# Patient Record
Sex: FEMALE | Race: WHITE | Employment: FULL TIME | URBAN - METROPOLITAN AREA
[De-identification: names, ages, dates, MRNs, and addresses within clinical notes are randomized per-mention and may not be internally consistent; named-entity substitution may affect disease eponyms.]

---

## 2017-03-29 ENCOUNTER — OFFICE VISIT (OUTPATIENT)
Dept: INTERNAL MEDICINE | Facility: CLINIC | Age: 37
End: 2017-03-29
Payer: COMMERCIAL

## 2017-03-29 VITALS
TEMPERATURE: 98.3 F | DIASTOLIC BLOOD PRESSURE: 60 MMHG | OXYGEN SATURATION: 99 % | SYSTOLIC BLOOD PRESSURE: 124 MMHG | BODY MASS INDEX: 22.68 KG/M2 | HEIGHT: 63 IN | WEIGHT: 128 LBS | RESPIRATION RATE: 12 BRPM | HEART RATE: 72 BPM

## 2017-03-29 DIAGNOSIS — J03.90 TONSILLITIS: ICD-10-CM

## 2017-03-29 DIAGNOSIS — R07.0 THROAT PAIN: Primary | ICD-10-CM

## 2017-03-29 LAB
DEPRECATED S PYO AG THROAT QL EIA: NORMAL
MICRO REPORT STATUS: NORMAL
SPECIMEN SOURCE: NORMAL

## 2017-03-29 PROCEDURE — 87880 STREP A ASSAY W/OPTIC: CPT | Performed by: NURSE PRACTITIONER

## 2017-03-29 PROCEDURE — 99213 OFFICE O/P EST LOW 20 MIN: CPT | Performed by: NURSE PRACTITIONER

## 2017-03-29 PROCEDURE — 87081 CULTURE SCREEN ONLY: CPT | Performed by: NURSE PRACTITIONER

## 2017-03-29 RX ORDER — CEFDINIR 300 MG/1
300 CAPSULE ORAL 2 TIMES DAILY
Qty: 20 CAPSULE | Refills: 0 | Status: SHIPPED | OUTPATIENT
Start: 2017-03-29 | End: 2017-08-02

## 2017-03-29 RX ORDER — MULTIPLE VITAMINS W/ MINERALS TAB 9MG-400MCG
1 TAB ORAL DAILY
Qty: 100 TABLET | Refills: 3 | COMMUNITY
Start: 2017-03-29 | End: 2018-03-20

## 2017-03-29 NOTE — MR AVS SNAPSHOT
After Visit Summary   3/29/2017    Dara Zavala    MRN: [id number]           Patient Information     Date Of Birth          1980        Visit Information        Provider Department      3/29/2017 2:40 PM Nataliya Boothe APRN CNP [facility]        Today's Diagnoses     Throat pain    -  1    Tonsillitis          Care Instructions    N: [facility] [phone]   [email or web address]  Call for an appointment     Omnicef twice daily for 10 days     Follow up with any questions or concerns.           Follow-ups after your visit        Additional Services     OTOLARYNGOLOGY REFERRAL       Your provider has referred you to: [facility]: [facility] [phone]   [email or web address]    Please be aware that coverage of these services is subject to the terms and limitations of your health insurance plan.  Call member services at your health plan with any benefit or coverage questions.      Please bring the following with you to your appointment:    (1) Any X-Rays, CTs or MRIs which have been performed.  Contact the facility where they were done to arrange for  prior to your scheduled appointment.   (2) List of current medications  (3) This referral request   (4) Any documents/labs given to you for this referral                  Who to contact     If you have questions or need follow up information about today's clinic visit or your schedule please contact [facility] directly at [phone].  Normal or non-critical lab and imaging results will be communicated to you by MyChart, letter or phone within 4 business days after the clinic has received the results. If you do not hear from us within 7 days, please contact the clinic through MyChart or phone. If you have a critical or abnormal lab result, we will notify you by phone as soon as possible.  Submit refill requests  "through [employer] or call your pharmacy and they will forward the refill request to us. Please allow 3 business days for your refill to be completed.          Additional Information About Your Visit        [employer] Information     [employer] lets you send messages to your doctor, view your test results, renew your prescriptions, schedule appointments and more. To sign up, go to [email or web address] . Click on \"Log in\" on the left side of the screen, which will take you to the Welcome page. Then click on \"Sign up Now\" on the right side of the page.     You will be asked to enter the access code listed below, as well as some personal information. Please follow the directions to create your username and password.     Your access code is: [email or web address]  Expires: 2017  3:14 PM     Your access code will  in 90 days. If you need help or a new code, please call your [facility] clinic or [phone].        Care EveryWhere ID     This is your Care EveryWhere ID. This could be used by other organizations to access your [facility] medical records  [id number]        Your Vitals Were     Pulse Temperature Respirations Height Last Period Pulse Oximetry    72 98.3  F (36.8  C) (Oral) 12 5' 3\" (1.6 m) 2017 99%    BMI (Body Mass Index)                   22.67 kg/m2            Blood Pressure from Last 3 Encounters:   17 124/60   16 104/68   16 120/58    Weight from Last 3 Encounters:   17 128 lb (58.1 kg)   16 132 lb (59.9 kg)   16 139 lb (63 kg)              We Performed the Following     Beta strep group A culture     OTOLARYNGOLOGY REFERRAL     Strep, Rapid Screen          Today's Medication Changes          These changes are accurate as of: 3/29/17  3:14 PM.  If you have any questions, ask your nurse or doctor.               Start taking these medicines.        Dose/Directions    cefdinir 300 MG capsule   Commonly known as:  OMNICEF   Used for:  Tonsillitis, Throat pain   Started " by:  Nataliya Boothe APRN CNP        Dose:  300 mg   Take 1 capsule (300 mg) by mouth 2 times daily   Quantity:  20 capsule   Refills:  0            Where to get your medicines      These medications were sent to [facility]/pharmacy #[id number] - [place], [place] - [address]  [address], [place] [address]     Phone:  [phone]     cefdinir 300 MG capsule                Primary Care Provider Office Phone # Fax #    Patricia Faustin [phone]       [facility] [address]  [facility] [address]        Thank you!     Thank you for choosing [facility]  for your care. Our goal is always to provide you with excellent care. Hearing back from our patients is one way we can continue to improve our services. Please take a few minutes to complete the written survey that you may receive in the mail after your visit with us. Thank you!             Your Updated Medication List - Protect others around you: Learn how to safely use, store and throw away your medicines at www.disposemymeds.org.          This list is accurate as of: 3/29/17  3:14 PM.  Always use your most recent med list.                   Brand Name Dispense Instructions for use    cefdinir 300 MG capsule    OMNICEF    20 capsule    Take 1 capsule (300 mg) by mouth 2 times daily       Multi-vitamin Tabs tablet     100 tablet    Take 1 tablet by mouth daily       norgestim-eth estrad triphasic 0.18/0.215/0.25 MG-25 MCG per tablet    ORTHO TRI-CYCLEN LO    84 tablet    Take 1 tablet by mouth daily

## 2017-03-29 NOTE — NURSING NOTE
"Chief Complaint   Patient presents with     Pharyngitis     Sore throat started last night. Headache, body aches today. Hx of strep.       Initial /60 (BP Location: Left arm, Patient Position: Chair, Cuff Size: Adult Regular)  Pulse 72  Temp 98.3  F (36.8  C) (Oral)  Resp 12  Ht 5' 3\" (1.6 m)  Wt 128 lb (58.1 kg)  LMP 03/27/2017  SpO2 99%  BMI 22.67 kg/m2 Estimated body mass index is 22.67 kg/(m^2) as calculated from the following:    Height as of this encounter: 5' 3\" (1.6 m).    Weight as of this encounter: 128 lb (58.1 kg).  Medication Reconciliation: complete     SAMANTHA Myers      "

## 2017-03-29 NOTE — LETTER
[place] [address]  [facility] [address]  [phone]          March 29, 2017    RE:  Dara Zavala                                                                                                                                                       [address]  [facility] [place] [facility] [place] [address]            To whom it may concern:    Dara Zavala was seen in clinic and is being treated for illness.          Sincerely,        Nataliya Boothe RN, CNP

## 2017-03-29 NOTE — PATIENT INSTRUCTIONS
[facility]: [facility] [phone]   [email or web address]  Call for an appointment     Omnicef twice daily for 10 days     Follow up with any questions or concerns.

## 2017-03-29 NOTE — PROGRESS NOTES
"  SUBJECTIVE:                                                    Dara Zavala is a 36 year old female who presents to clinic today for the following health issues:    Chief Complaint   Patient presents with     Pharyngitis     Sore throat started last night. Headache, body aches today. Hx of strep.  No fever and usually does   Tender lymph nodes and throat irritated   Happening often   Strep as adult 7 times     Penicillin rash was only a small rash on her lower arm - they just said to stay away from it in case             Problem list and histories reviewed & adjusted, as indicated.  Additional history: as documented    There is no problem list on file for this patient.    No past surgical history on file.    Social History   Substance Use Topics     Smoking status: Current Every Day Smoker     Smokeless tobacco: Not on file     Alcohol use Yes      Comment: occasional     Family History   Problem Relation Age of Onset     Alcohol/Drug Mother      alcohol     Other - See Comments Father      accident-electricuted     Other - See Comments Maternal Grandmother      MVA     Alcohol/Drug Maternal Grandfather      drinker           Reviewed and updated as needed this visit by clinical staff  Tobacco  Allergies  Soc Hx      Reviewed and updated as needed this visit by Provider         ROS:  Constitutional, HEENT, cardiovascular, pulmonary, GI, , musculoskeletal, neuro, skin, endocrine and psych systems are negative, except as otherwise noted.    OBJECTIVE:                                                    /60 (BP Location: Left arm, Patient Position: Chair, Cuff Size: Adult Regular)  Pulse 72  Temp 98.3  F (36.8  C) (Oral)  Resp 12  Ht 5' 3\" (1.6 m)  Wt 128 lb (58.1 kg)  LMP 03/27/2017  SpO2 99%  BMI 22.67 kg/m2  Body mass index is 22.67 kg/(m^2).  GENERAL: alert and mod distress  HENT: nose and mouth with erythema in tonsils with exudate bilaterally ; right worse than left i  RESP: lungs clear to " auscultation - no rales, rhonchi or wheezes  CV: regular rate and rhythm  PSYCH: mentation appears normal, affect normal/bright    Diagnostic Test Results:  Strep negative      ASSESSMENT/PLAN:                                                              ICD-10-CM    1. Throat pain R07.0 Strep, Rapid Screen     OTOLARYNGOLOGY REFERRAL     Beta strep group A culture     cefdinir (OMNICEF) 300 MG capsule   2. Tonsillitis J03.90 cefdinir (OMNICEF) 300 MG capsule       Patient Instructions   [facility]: [facility] [phone]   [email or web address]  Call for an appointment     Omnicef twice daily for 10 days     Follow up with any questions or concerns.         CATHY Zimmerman [facility]

## 2017-03-31 LAB
BACTERIA SPEC CULT: NORMAL
MICRO REPORT STATUS: NORMAL
SPECIMEN SOURCE: NORMAL

## 2017-05-16 DIAGNOSIS — Z30.41 ENCOUNTER FOR SURVEILLANCE OF CONTRACEPTIVE PILLS: ICD-10-CM

## 2017-05-16 RX ORDER — NORGESTIMATE AND ETHINYL ESTRADIOL 7DAYSX3 LO
KIT ORAL
Qty: 84 TABLET | Refills: 0 | Status: SHIPPED | OUTPATIENT
Start: 2017-05-16 | End: 2017-07-26

## 2017-05-16 NOTE — TELEPHONE ENCOUNTER
Ortho Tri-cyclen      Last Written Prescription Date: 6/28/16  Last Fill Quantity: 84,  # refills: 3   [place] Office Visit with [facility] [place] or [facility] prescribing provider: 3/29/17                                         Next 5 appointments (look out 90 days)     Jul 14, 2017  9:20 AM CDT   [facility]   [facility]    [address] [facility] [address] [phone]               Prescription approved per [facility] Refill Protocol.

## 2017-07-25 ENCOUNTER — NURSE TRIAGE (OUTPATIENT)
Dept: NURSING | Facility: CLINIC | Age: 37
End: 2017-07-25

## 2017-07-25 DIAGNOSIS — Z30.41 ENCOUNTER FOR SURVEILLANCE OF CONTRACEPTIVE PILLS: ICD-10-CM

## 2017-07-25 RX ORDER — NORGESTIMATE AND ETHINYL ESTRADIOL 7DAYSX3 LO
1 KIT ORAL DAILY
Qty: 84 TABLET | Refills: 0 | OUTPATIENT
Start: 2017-07-25

## 2017-07-25 NOTE — TELEPHONE ENCOUNTER
Clinic Action Needed?      [facility]NA Triage Call   Presenting Problem: Pt calling FNA requesting refill on norgestim-eth estrad triphasic (ORTHO TRI-CYCLEN LO). Pharmacy sent refill request to clinic because they have no refills left on Rx and pt states she is out. Clinic denied refill because too soon and due for yearly visit. Pt states she was not notified of denial. Last Px June 2016 so due for annual visit. Rec'd 84 tablets on 5/16/17; should be enough until next month. Pt says she skips placebo pill week. Instead starts the next week of active pills. Even so, this request is early. Given this, cannot refill Rx.Will defer to clinic and they denied refill    Patient Teaching/Discussion: Disc'd above w/pt and explained we cannot refill Rx tonight. Advised pt call tomorrow during clinic hours to discuss w/ provider. Pt states she will do this. Linda Lamas RN/FNA      Routed to:  MAVERICK rice

## 2017-07-25 NOTE — TELEPHONE ENCOUNTER
"Requesting refill on BCPs.  Please see telephone note dated today (7/25/17) and sent to clinic. Linda Lamas RN/CARMEN    Reason for Disposition    Caller requesting a NON-URGENT new prescription or refill and triager unable to refill per unit policy    Additional Information    Negative: Drug overdose and nurse unable to answer question    Negative: Caller requesting information not related to medicine    Negative: Caller requesting a prescription for Strep throat and has a positive culture result    Negative: Rash while taking a medication or within 3 days of stopping it    Negative: Immunization reaction suspected    Negative: [1] Asthma and [2] having symptoms of asthma (cough, wheezing, etc)    Negative: MORE THAN A DOUBLE DOSE of a prescription or over-the-counter (OTC) drug    Negative: [1] DOUBLE DOSE (an extra dose or lesser amount) of over-the-counter (OTC) drug AND [2] any symptoms (e.g., dizziness, nausea, pain, sleepiness)    Negative: [1] DOUBLE DOSE (an extra dose or lesser amount) of prescription drug AND [2] any symptoms (e.g., dizziness, nausea, pain, sleepiness)    Negative: Took another person's prescription drug    Negative: [1] DOUBLE DOSE (an extra dose or lesser amount) of prescription drug AND [2] NO symptoms (Exception: a double dose of antibiotics)    Negative: Diabetes drug error or overdose (e.g., insulin or extra dose)    Negative: [1] Request for URGENT new prescription or refill of \"essential\" medication (i.e., likelihood of harm to patient if not taken) AND [2] triager unable to fill per unit policy    Negative: [1] Prescription not at pharmacy AND [2] was prescribed today by PCP    Negative: Pharmacy calling with prescription questions and triager unable to answer question    Negative: Caller has URGENT medication question about med that PCP prescribed and triager unable to answer question    Negative: Caller has NON-URGENT medication question about med that PCP prescribed and triager " unable to answer question    Protocols used: MEDICATION QUESTION CALL-ADULT-

## 2017-07-25 NOTE — TELEPHONE ENCOUNTER
Ortho Tri-Cyclen Lo      Last Written Prescription Date: 5/16/17  Last Fill Quantity: 84,  # refills: 0   Last Office Visit with G, P or [facility] prescribing provider: 3/29/17

## 2017-07-26 RX ORDER — NORGESTIMATE AND ETHINYL ESTRADIOL 7DAYSX3 LO
1 KIT ORAL DAILY
Qty: 84 TABLET | Refills: 0 | Status: SHIPPED | OUTPATIENT
Start: 2017-07-26 | End: 2017-10-16

## 2017-07-26 NOTE — TELEPHONE ENCOUNTER
Pt scheduled Physical appt for 8/2/17 with Nataliya Boothe/Fasting/Pap.  Needs enough Birth control until appt.

## 2017-08-02 ENCOUNTER — TELEPHONE (OUTPATIENT)
Dept: INTERNAL MEDICINE | Facility: CLINIC | Age: 37
End: 2017-08-02

## 2017-08-02 ENCOUNTER — OFFICE VISIT (OUTPATIENT)
Dept: INTERNAL MEDICINE | Facility: CLINIC | Age: 37
End: 2017-08-02
Payer: COMMERCIAL

## 2017-08-02 VITALS
DIASTOLIC BLOOD PRESSURE: 68 MMHG | RESPIRATION RATE: 16 BRPM | HEART RATE: 63 BPM | TEMPERATURE: 98.3 F | WEIGHT: 136.5 LBS | BODY MASS INDEX: 24.19 KG/M2 | OXYGEN SATURATION: 99 % | SYSTOLIC BLOOD PRESSURE: 100 MMHG | HEIGHT: 63 IN

## 2017-08-02 DIAGNOSIS — N76.0 VAGINITIS AND VULVOVAGINITIS: Primary | ICD-10-CM

## 2017-08-02 DIAGNOSIS — N76.0 VAGINITIS AND VULVOVAGINITIS: ICD-10-CM

## 2017-08-02 DIAGNOSIS — Z00.00 HEALTH CARE MAINTENANCE: Primary | ICD-10-CM

## 2017-08-02 LAB
ANION GAP SERPL CALCULATED.3IONS-SCNC: 8 MMOL/L (ref 3–14)
BUN SERPL-MCNC: 11 MG/DL (ref 7–30)
CALCIUM SERPL-MCNC: 8.7 MG/DL (ref 8.5–10.1)
CHLORIDE SERPL-SCNC: 105 MMOL/L (ref 94–109)
CHOLEST SERPL-MCNC: 163 MG/DL
CO2 SERPL-SCNC: 27 MMOL/L (ref 20–32)
CREAT SERPL-MCNC: 0.89 MG/DL (ref 0.52–1.04)
ERYTHROCYTE [DISTWIDTH] IN BLOOD BY AUTOMATED COUNT: 11.7 % (ref 10–15)
GFR SERPL CREATININE-BSD FRML MDRD: 72 ML/MIN/1.7M2
GLUCOSE SERPL-MCNC: 87 MG/DL (ref 70–99)
HCT VFR BLD AUTO: 40.3 % (ref 35–47)
HDLC SERPL-MCNC: 84 MG/DL
HGB BLD-MCNC: 13.4 G/DL (ref 11.7–15.7)
LDLC SERPL CALC-MCNC: 62 MG/DL
MCH RBC QN AUTO: 32.1 PG (ref 26.5–33)
MCHC RBC AUTO-ENTMCNC: 33.3 G/DL (ref 31.5–36.5)
MCV RBC AUTO: 97 FL (ref 78–100)
MICRO REPORT STATUS: ABNORMAL
NONHDLC SERPL-MCNC: 79 MG/DL
PLATELET # BLD AUTO: 292 10E9/L (ref 150–450)
POTASSIUM SERPL-SCNC: 4.1 MMOL/L (ref 3.4–5.3)
RBC # BLD AUTO: 4.17 10E12/L (ref 3.8–5.2)
SODIUM SERPL-SCNC: 140 MMOL/L (ref 133–144)
SPECIMEN SOURCE: ABNORMAL
TRIGL SERPL-MCNC: 85 MG/DL
WBC # BLD AUTO: 9.3 10E9/L (ref 4–11)
WET PREP SPEC: ABNORMAL

## 2017-08-02 PROCEDURE — 80061 LIPID PANEL: CPT | Performed by: NURSE PRACTITIONER

## 2017-08-02 PROCEDURE — 99395 PREV VISIT EST AGE 18-39: CPT | Performed by: NURSE PRACTITIONER

## 2017-08-02 PROCEDURE — 80048 BASIC METABOLIC PNL TOTAL CA: CPT | Performed by: NURSE PRACTITIONER

## 2017-08-02 PROCEDURE — 87210 SMEAR WET MOUNT SALINE/INK: CPT | Performed by: NURSE PRACTITIONER

## 2017-08-02 PROCEDURE — 36415 COLL VENOUS BLD VENIPUNCTURE: CPT | Performed by: NURSE PRACTITIONER

## 2017-08-02 PROCEDURE — 85027 COMPLETE CBC AUTOMATED: CPT | Performed by: NURSE PRACTITIONER

## 2017-08-02 RX ORDER — FLUCONAZOLE 150 MG/1
150 TABLET ORAL ONCE
Qty: 1 TABLET | Refills: 0 | Status: SHIPPED | OUTPATIENT
Start: 2017-08-02 | End: 2017-08-02

## 2017-08-02 NOTE — LETTER
[facility]  [address], [address]  [place] [address]  [phone]        August 2, 2017    Dara Zavala  [address]   [place] [address]            Dear Ms. Dara Zavala:      The results of your recent Basic profile, CBC Panel and Lipid profile were NORMAL.  If you have any further questions or problems, please contact our office.    Sincerely,        Elizabeth Iverson C.N.P.

## 2017-08-02 NOTE — NURSING NOTE
"Chief Complaint   Patient presents with     Physical     fasting, no pap needed.       Initial /68 (BP Location: Right arm, Patient Position: Sitting, Cuff Size: Adult Large)  Pulse 63  Temp 98.3  F (36.8  C) (Oral)  Resp 16  Ht 5' 3\" (1.6 m)  Wt 136 lb 8 oz (61.9 kg)  LMP 07/21/2017 (Exact Date)  SpO2 99%  BMI 24.18 kg/m2 Estimated body mass index is 24.18 kg/(m^2) as calculated from the following:    Height as of this encounter: 5' 3\" (1.6 m).    Weight as of this encounter: 136 lb 8 oz (61.9 kg).  Medication Reconciliation: complete    "

## 2017-08-02 NOTE — PROGRESS NOTES
SUBJECTIVE:   CC: Dara Zavala is an 36 year old woman who presents for preventive health visit.     Healthy Habits:    Do you get at least three servings of calcium containing foods daily (dairy, green leafy vegetables, etc.)? yes    Amount of exercise or daily activities, outside of work: 4 day(s) per week    Problems taking medications regularly No    Medication side effects: No    Have you had an eye exam in the past two years? no    Do you see a dentist twice per year? yes    Do you have sleep apnea, excessive snoring or daytime drowsiness?no        Vaginal Symptoms  Duration of complaint: 2 years, since retained tampon for several months   Description:  Vaginal Discharge: white creamy   Itching (Pruritis): no  Burning sensation:  no  Odor: YES  Accompanying Signs & Symptoms:  Pain with Urination: no  Abdominal Pain: no  Fever: no  History:   Sexually active: YES  New Partner: no   Possibility of Pregnancy:  No  Precipitating factors:   Recent Antibiotic Use: no  Alleviating factors: none  Therapies Tried and outcome: none        Today's PHQ-2 Score: PHQ-2 ( 1999 Pfizer) 3/29/2017 5/13/2015   Q1: Little interest or pleasure in doing things 0 0   Q2: Feeling down, depressed or hopeless 0 0   PHQ-2 Score 0 0       Abuse: Current or Past(Physical, Sexual or Emotional)- No  Do you feel safe in your environment - Yes    Social History   Substance Use Topics     Smoking status: Current Every Day Smoker     Smokeless tobacco: Never Used     Alcohol use Yes      Comment: occasional     The patient does not drink >3 drinks per day nor >7 drinks per week.    Reviewed orders with patient.  Reviewed health maintenance and updated orders accordingly - Yes  BP Readings from Last 3 Encounters:   08/02/17 100/68   03/29/17 124/60   06/28/16 104/68    Wt Readings from Last 3 Encounters:   08/02/17 136 lb 8 oz (61.9 kg)   03/29/17 128 lb (58.1 kg)   06/28/16 132 lb (59.9 kg)                  There is no problem list on  "file for this patient.    History reviewed. No pertinent surgical history.    Social History   Substance Use Topics     Smoking status: Current Every Day Smoker     Types: Cigarettes     Smokeless tobacco: Never Used     Alcohol use Yes      Comment: 4-6 beers 2-3 x weekly     Family History   Problem Relation Age of Onset     Alcohol/Drug Mother      alcohol     Other - See Comments Father      accident-electricuted     Other - See Comments Maternal Grandmother      MVA     Alcohol/Drug Maternal Grandfather      drinker         Current Outpatient Prescriptions   Medication Sig Dispense Refill     norgestim-eth estrad triphasic (ORTHO TRI-CYCLEN LO) 0.18/0.215/0.25 MG-25 MCG per tablet Take 1 tablet by mouth daily 84 tablet 0     multivitamin, therapeutic with minerals (MULTI-VITAMIN) TABS tablet Take 1 tablet by mouth daily 100 tablet 3           Mammogram not appropriate for this patient based on age.    Pertinent mammograms are reviewed under the imaging tab.  History of abnormal Pap smear: NO - age 30- 65 PAP every 3 years recommended    Reviewed and updated as needed this visit by clinical staffTobacco  Allergies  Meds  Med Hx  Surg Hx  Fam Hx  Soc Hx        Reviewed and updated as needed this visit by Provider            ROS:  C: NEGATIVE for fever, chills, change in weight  ENT: NEGATIVE for ear, mouth and throat problems  R: NEGATIVE for significant cough or SOB  CV: NEGATIVE for chest pain, palpitations or peripheral edema  GI: NEGATIVE for nausea, abdominal pain, heartburn, or change in bowel habits  M: NEGATIVE for significant arthralgias or myalgia  N: NEGATIVE for weakness, dizziness or paresthesias  P: NEGATIVE for changes in mood or affect    OBJECTIVE:   /68 (BP Location: Right arm, Patient Position: Sitting, Cuff Size: Adult Large)  Pulse 63  Temp 98.3  F (36.8  C) (Oral)  Resp 16  Ht 5' 3\" (1.6 m)  Wt 136 lb 8 oz (61.9 kg)  LMP 07/21/2017 (Exact Date)  SpO2 99%  BMI 24.18 " "kg/m2  EXAM:  GENERAL: healthy, alert and no distress  NECK: no adenopathy, no asymmetry, masses, or scars and thyroid normal to palpation  RESP: lungs clear to auscultation - no rales, rhonchi or wheezes  CV: regular rate and rhythm, normal S1 S2, no S3 or S4, no murmur, click or rub, no peripheral edema and peripheral pulses strong  ABDOMEN: soft, nontender, no hepatosplenomegaly, no masses and bowel sounds normal  MS: no gross musculoskeletal defects noted, no edema    ASSESSMENT/PLAN:       ICD-10-CM    1. Health care maintenance Z00.00 CBC with platelets     Basic metabolic panel     Lipid Profile   2. Vaginitis and vulvovaginitis N76.0 Wet prep       COUNSELING:   Reviewed preventive health counseling, as reflected in patient instructions         reports that she has been smoking.  She has never used smokeless tobacco.    Estimated body mass index is 24.18 kg/(m^2) as calculated from the following:    Height as of this encounter: 5' 3\" (1.6 m).    Weight as of this encounter: 136 lb 8 oz (61.9 kg).         Counseling Resources:  ATP IV Guidelines  Pooled Cohorts Equation Calculator  Breast Cancer Risk Calculator  FRAX Risk Assessment  ICSI Preventive Guidelines  Dietary Guidelines for Americans, 2010  USDA's MyPlate  ASA Prophylaxis  Lung CA Screening    Elizabeth Iverson NP  [facility]  "

## 2017-08-02 NOTE — MR AVS SNAPSHOT
After Visit Summary   8/2/2017    Dara Zavala    MRN: [id number]           Patient Information     Date Of Birth          1980        Visit Information        Provider Department      8/2/2017 8:00 AM Elizabeth Iverson NP [facility]        Today's Diagnoses     Health care maintenance    -  1    Vaginitis and vulvovaginitis          Care Instructions      Preventive Health Recommendations  Female Ages 26 - 39  Yearly exam:   See your health care provider every year in order to    Review health changes.     Discuss preventive care.      Review your medicines if you your doctor has prescribed any.    Until age 30: Get a Pap test every three years (more often if you have had an abnormal result).    After age 30: Talk to your doctor about whether you should have a Pap test every 3 years or have a Pap test with HPV screening every 5 years.   You do not need a Pap test if your uterus was removed (hysterectomy) and you have not had cancer.  You should be tested each year for STDs (sexually transmitted diseases), if you're at risk.   Talk to your provider about how often to have your cholesterol checked.  If you are at risk for diabetes, you should have a diabetes test (fasting glucose).  Shots: Get a flu shot each year. Get a tetanus shot every 10 years.   Nutrition:     Eat at least 5 servings of fruits and vegetables each day.    Eat whole-grain bread, whole-wheat pasta and brown rice instead of white grains and rice.    Talk to your provider about Calcium and Vitamin D.     Lifestyle    Exercise at least 150 minutes a week (30 minutes a day, 5 days of the week). This will help you control your weight and prevent disease.    Limit alcohol to one drink per day.    No smoking.     Wear sunscreen to prevent skin cancer.    See your dentist every six months for an exam and cleaning.            Follow-ups after your visit        Who to contact     If you have questions or need  "follow up information about today's clinic visit or your schedule please contact [facility] directly at [phone].  Normal or non-critical lab and imaging results will be communicated to you by MyChart, letter or phone within 4 business days after the clinic has received the results. If you do not hear from us within 7 days, please contact the clinic through Matchpointhart or phone. If you have a critical or abnormal lab result, we will notify you by phone as soon as possible.  Submit refill requests through Lexdir or call your pharmacy and they will forward the refill request to us. Please allow 3 business days for your refill to be completed.          Additional Information About Your Visit        MatchpointharCL3VER Information     Lexdir gives you secure access to your electronic health record. If you see a primary care provider, you can also send messages to your care team and make appointments. If you have questions, please call your primary care clinic.  If you do not have a primary care provider, please call [phone] and they will assist you.        Care EveryWhere ID     This is your Care EveryWhere ID. This could be used by other organizations to access your [place] medical records  [id number]        Your Vitals Were     Pulse Temperature Respirations Height Last Period Pulse Oximetry    63 98.3  F (36.8  C) (Oral) 16 5' 3\" (1.6 m) 07/21/2017 (Exact Date) 99%    BMI (Body Mass Index)                   24.18 kg/m2            Blood Pressure from Last 3 Encounters:   08/02/17 100/68   03/29/17 124/60   06/28/16 104/68    Weight from Last 3 Encounters:   08/02/17 136 lb 8 oz (61.9 kg)   03/29/17 128 lb (58.1 kg)   06/28/16 132 lb (59.9 kg)              We Performed the Following     Basic metabolic panel     CBC with platelets     Lipid Profile     Wet prep        Primary Care Provider Office Phone # Fax #    Patricia Faustin [phone]       [facility] [address] " [facility] [address]  [facility] [address]        Equal Access to Services     INDIGO MUNIZ : Hadii dominga coats bethanieivana Dcali, waaxda luqadaha, qaybta kaayshamargarita adams, alma delia porrasperrydionisio saul. So [facility] [phone].    ATENCIÓN: Si habla español, tiene a newell disposición servicios gratuitos de asistencia lingüística. Llame al [phone].    We comply with applicable federal civil rights laws and [place] laws. We do not discriminate on the basis of race, color, national origin, age, disability sex, sexual orientation or gender identity.            Thank you!     Thank you for choosing [facility]  for your care. Our goal is always to provide you with excellent care. Hearing back from our patients is one way we can continue to improve our services. Please take a few minutes to complete the written survey that you may receive in the mail after your visit with us. Thank you!             Your Updated Medication List - Protect others around you: Learn how to safely use, store and throw away your medicines at www.disposemymeds.org.          This list is accurate as of: 8/2/17  8:46 AM.  Always use your most recent med list.                   Brand Name Dispense Instructions for use Diagnosis    Multi-vitamin Tabs tablet     100 tablet    Take 1 tablet by mouth daily        norgestim-eth estrad triphasic 0.18/0.215/0.25 MG-25 MCG per tablet    ORTHO TRI-CYCLEN LO    84 tablet    Take 1 tablet by mouth daily    Encounter for surveillance of contraceptive pills

## 2017-10-16 DIAGNOSIS — Z30.41 ENCOUNTER FOR SURVEILLANCE OF CONTRACEPTIVE PILLS: ICD-10-CM

## 2017-10-16 RX ORDER — NORGESTIMATE AND ETHINYL ESTRADIOL
KIT
Qty: 84 TABLET | Refills: 0 | Status: CANCELLED | OUTPATIENT
Start: 2017-10-16

## 2017-10-16 RX ORDER — NORGESTIMATE AND ETHINYL ESTRADIOL 7DAYSX3 LO
1 KIT ORAL DAILY
Qty: 84 TABLET | Refills: 2 | Status: SHIPPED | OUTPATIENT
Start: 2017-10-16 | End: 2018-03-20

## 2017-10-16 NOTE — TELEPHONE ENCOUNTER
Patient states she is out of BCP, needs refill for tonight's pill.  Ortho tri-cyclen Lo Prescription approved per [facility] Refill Protocol.

## 2017-10-17 DIAGNOSIS — Z30.41 ENCOUNTER FOR SURVEILLANCE OF CONTRACEPTIVE PILLS: ICD-10-CM

## 2017-10-17 NOTE — TELEPHONE ENCOUNTER
Sprintec    DUPLICATE?  Last Written Prescription Date: 10/16/17  Last Fill Quantity: 84,  # refills: 2   Last Office Visit with G, P or [facility] prescribing provider: 08/02/17 Iverson

## 2017-10-19 RX ORDER — NORGESTIMATE AND ETHINYL ESTRADIOL
KIT
Qty: 84 TABLET | Refills: 0 | OUTPATIENT
Start: 2017-10-19

## 2017-11-09 ENCOUNTER — OFFICE VISIT (OUTPATIENT)
Dept: FAMILY MEDICINE | Facility: CLINIC | Age: 37
End: 2017-11-09
Payer: COMMERCIAL

## 2017-11-09 VITALS
HEART RATE: 88 BPM | BODY MASS INDEX: 24 KG/M2 | SYSTOLIC BLOOD PRESSURE: 110 MMHG | TEMPERATURE: 98.4 F | DIASTOLIC BLOOD PRESSURE: 76 MMHG | WEIGHT: 135.5 LBS | OXYGEN SATURATION: 98 % | RESPIRATION RATE: 16 BRPM

## 2017-11-09 DIAGNOSIS — J20.9 ACUTE BRONCHITIS, UNSPECIFIED ORGANISM: Primary | ICD-10-CM

## 2017-11-09 PROCEDURE — 99213 OFFICE O/P EST LOW 20 MIN: CPT | Performed by: PHYSICIAN ASSISTANT

## 2017-11-09 RX ORDER — AZITHROMYCIN 250 MG/1
TABLET, FILM COATED ORAL
Qty: 6 TABLET | Refills: 0 | Status: SHIPPED | OUTPATIENT
Start: 2017-11-09 | End: 2018-03-20

## 2017-11-09 RX ORDER — ALBUTEROL SULFATE 90 UG/1
2 AEROSOL, METERED RESPIRATORY (INHALATION) EVERY 6 HOURS PRN
Qty: 1 INHALER | Refills: 0 | Status: SHIPPED | OUTPATIENT
Start: 2017-11-09 | End: 2018-03-20

## 2017-11-09 NOTE — PROGRESS NOTES
SUBJECTIVE:   Dara Zavala is a 37 year old female who presents to clinic today for the following health issues:      RESPIRATORY SYMPTOMS      Duration: 2 weeks    Description  nasal congestion, sore throat and cough    Severity: mild to moderate    Accompanying signs and symptoms: coughing up green mucus, wheezing    History (predisposing factors):  none    Therapies tried and outcome:  Cough drops            Problem list and histories reviewed & adjusted, as indicated.  Additional history: as documented    There is no problem list on file for this patient.    History reviewed. No pertinent surgical history.    Social History   Substance Use Topics     Smoking status: Current Every Day Smoker     Types: Cigarettes     Smokeless tobacco: Never Used     Alcohol use Yes      Comment: 4-6 beers 2-3 x weekly     Family History   Problem Relation Age of Onset     Alcohol/Drug Mother      alcohol     Other - See Comments Father      accident-electricuted     Other - See Comments Maternal Grandmother      MVA     Alcohol/Drug Maternal Grandfather      drinker         Current Outpatient Prescriptions   Medication Sig Dispense Refill     azithromycin (ZITHROMAX) 250 MG tablet Two tablets first day, then one tablet daily for four days. 6 tablet 0     albuterol (PROAIR HFA/PROVENTIL HFA/VENTOLIN HFA) 108 (90 BASE) MCG/ACT Inhaler Inhale 2 puffs into the lungs every 6 hours as needed for shortness of breath / dyspnea or wheezing 1 Inhaler 0     norgestim-eth estrad triphasic (ORTHO TRI-CYCLEN LO) 0.18/0.215/0.25 MG-25 MCG per tablet Take 1 tablet by mouth daily 84 tablet 2     multivitamin, therapeutic with minerals (MULTI-VITAMIN) TABS tablet Take 1 tablet by mouth daily 100 tablet 3     Allergies   Allergen Reactions     Penicillins      rash         Reviewed and updated as needed this visit by clinical staffTobacco  Allergies  Meds  Problems  Med Hx  Surg Hx  Fam Hx  Soc Hx        Reviewed and updated as needed  this visit by Provider  Allergies  Meds  Problems         ROS:  Constitutional, HEENT, cardiovascular, pulmonary, gi and gu systems are negative, except as otherwise noted.      OBJECTIVE:   /76 (BP Location: Left arm, Patient Position: Sitting, Cuff Size: Adult Regular)  Pulse 88  Temp 98.4  F (36.9  C) (Oral)  Resp 16  Wt 135 lb 8 oz (61.5 kg)  SpO2 98%  BMI 24 kg/m2  Body mass index is 24 kg/(m^2).  GENERAL: healthy, alert and no distress  EYES: Eyes grossly normal to inspection, PERRL and conjunctivae and sclerae normal  HENT: ear canals and TM's normal, nose and mouth without ulcers or lesions  NECK: no adenopathy, no asymmetry, masses, or scars and thyroid normal to palpation  RESP: lungs clear to auscultation - no rales, rhonchi; slight wheeze and chest congestion throughout  CV: regular rate and rhythm, normal S1 S2, no S3 or S4, no murmur, click or rub, no peripheral edema and peripheral pulses strong  MS: no gross musculoskeletal defects noted, no edema    Diagnostic Test Results:  none     ASSESSMENT/PLAN:       ICD-10-CM    1. Acute bronchitis, unspecified organism J20.9 azithromycin (ZITHROMAX) 250 MG tablet     albuterol (PROAIR HFA/PROVENTIL HFA/VENTOLIN HFA) 108 (90 BASE) MCG/ACT Inhaler       Patient Instructions   Encouraged mucinex/guafenisin, warm salt water gargles, cepacol spray, soothers/lozenges, sinus rinses (neilmed), vitamin c, fluids and rest.  May alternate tylenol and NSAIDS (ibuprofen, advil, aleve type products) every 4-6 hours for the next few days as needed.    Prescription for zpack (antibiotic) and rescue inhaler (albuterol) sent to pharmacy if no improvement with above.  Return to clinic with any worsening or changes in symptoms.       Ondina Desai PA-C  [facility]

## 2017-11-09 NOTE — PATIENT INSTRUCTIONS
Encouraged mucinex/guafenisin, warm salt water gargles, cepacol spray, soothers/lozenges, sinus rinses (neilmed), vitamin c, fluids and rest.  May alternate tylenol and NSAIDS (ibuprofen, advil, aleve type products) every 4-6 hours for the next few days as needed.    Prescription for zpack (antibiotic) and rescue inhaler (albuterol) sent to pharmacy if no improvement with above.  Return to clinic with any worsening or changes in symptoms.

## 2017-11-09 NOTE — MR AVS SNAPSHOT
After Visit Summary   11/9/2017    Dara Zavala    MRN: [id number]           Patient Information     Date Of Birth          1980        Visit Information        Provider Department      11/9/2017 1:20 PM Ondina Desai PA-C [facility]        Today's Diagnoses     Acute bronchitis, unspecified organism    -  1      Care Instructions    Encouraged mucinex/guafenisin, warm salt water gargles, cepacol spray, soothers/lozenges, sinus rinses (neilmed), vitamin c, fluids and rest.  May alternate tylenol and NSAIDS (ibuprofen, advil, aleve type products) every 4-6 hours for the next few days as needed.    Prescription for zpack (antibiotic) and rescue inhaler (albuterol) sent to pharmacy if no improvement with above.  Return to clinic with any worsening or changes in symptoms.           Follow-ups after your visit        Who to contact     If you have questions or need follow up information about today's clinic visit or your schedule please contact [facility] directly at [phone].  Normal or non-critical lab and imaging results will be communicated to you by ProZymehart, letter or phone within 4 business days after the clinic has received the results. If you do not hear from us within 7 days, please contact the clinic through Alana HealthCaret or phone. If you have a critical or abnormal lab result, we will notify you by phone as soon as possible.  Submit refill requests through Aktivito or call your pharmacy and they will forward the refill request to us. Please allow 3 business days for your refill to be completed.          Additional Information About Your Visit        MyChart Information     Aktivito gives you secure access to your electronic health record. If you see a primary care provider, you can also send messages to your care team and make appointments. If you have questions, please call your primary care clinic.  If you do not have a primary care provider, please  call [phone] and they will assist you.        Care EveryWhere ID     This is your Care EveryWhere ID. This could be used by other organizations to access your [place] medical records  [id number]        Your Vitals Were     Pulse Temperature Respirations Pulse Oximetry BMI (Body Mass Index)       88 98.4  F (36.9  C) (Oral) 16 98% 24 kg/m2        Blood Pressure from Last 3 Encounters:   11/09/17 110/76   08/02/17 100/68   03/29/17 124/60    Weight from Last 3 Encounters:   11/09/17 135 lb 8 oz (61.5 kg)   08/02/17 136 lb 8 oz (61.9 kg)   03/29/17 128 lb (58.1 kg)              Today, you had the following     No orders found for display         Today's Medication Changes          These changes are accurate as of: 11/9/17  1:32 PM.  If you have any questions, ask your nurse or doctor.               Start taking these medicines.        Dose/Directions    albuterol 108 (90 BASE) MCG/ACT Inhaler   Commonly known as:  PROAIR HFA/PROVENTIL HFA/VENTOLIN HFA   Used for:  Acute bronchitis, unspecified organism   Started by:  Ondina Desai PA-C        Dose:  2 puff   Inhale 2 puffs into the lungs every 6 hours as needed for shortness of breath / dyspnea or wheezing   Quantity:  1 Inhaler   Refills:  0       azithromycin 250 MG tablet   Commonly known as:  ZITHROMAX   Used for:  Acute bronchitis, unspecified organism   Started by:  Ondina Desai PA-C        Two tablets first day, then one tablet daily for four days.   Quantity:  6 tablet   Refills:  0            Where to get your medicines      These medications were sent to [facility]/pharmacy #[id number] [facility] [address] [facility]  [address] [address]     Phone:  [phone]     albuterol 108 (90 BASE) MCG/ACT Inhaler    azithromycin 250 MG tablet                Primary Care Provider Office Phone # Fax #    Patricia Faustin [phone]       [address]  [place] [address]        Equal Access to Services     [facility] : Hadii dominga coats miguel Nur, waaxda luqadaha, qaybta kaalmada lázaronataliemargarita, waxliz zack porrasperrydionisio saul. So [facility] [phone].    ATENCIÓN: Si habla español, tiene a newell disposición servicios gratuitos de asistencia lingüística. Sophiaame al [phone].    We comply with applicable federal civil rights laws and [place] laws. We do not discriminate on the basis of race, color, national origin, age, disability, sex, sexual orientation, or gender identity.            Thank you!     Thank you for choosing [facility]  for your care. Our goal is always to provide you with excellent care. Hearing back from our patients is one way we can continue to improve our services. Please take a few minutes to complete the written survey that you may receive in the mail after your visit with us. Thank you!             Your Updated Medication List - Protect others around you: Learn how to safely use, store and throw away your medicines at www.disposemymeds.org.          This list is accurate as of: 11/9/17  1:32 PM.  Always use your most recent med list.                   Brand Name Dispense Instructions for use Diagnosis    albuterol 108 (90 BASE) MCG/ACT Inhaler    PROAIR HFA/PROVENTIL HFA/VENTOLIN HFA    1 Inhaler    Inhale 2 puffs into the lungs every 6 hours as needed for shortness of breath / dyspnea or wheezing    Acute bronchitis, unspecified organism       azithromycin 250 MG tablet    ZITHROMAX    6 tablet    Two tablets first day, then one tablet daily for four days.    Acute bronchitis, unspecified organism       Multi-vitamin Tabs tablet     100 tablet    Take 1 tablet by mouth daily        norgestim-eth estrad triphasic 0.18/0.215/0.25 MG-25 MCG per tablet    ORTHO TRI-CYCLEN LO    84 tablet    Take 1 tablet by mouth daily    Encounter for surveillance of contraceptive pills

## 2017-11-09 NOTE — NURSING NOTE
"Chief Complaint   Patient presents with     Cough       Initial /76 (BP Location: Left arm, Patient Position: Sitting, Cuff Size: Adult Regular)  Pulse 88  Temp 98.4  F (36.9  C) (Oral)  Resp 16  Wt 135 lb 8 oz (61.5 kg)  SpO2 98%  BMI 24 kg/m2 Estimated body mass index is 24 kg/(m^2) as calculated from the following:    Height as of 8/2/17: 5' 3\" (1.6 m).    Weight as of this encounter: 135 lb 8 oz (61.5 kg).  Medication Reconciliation: complete     Kenton Foster CMA  "

## 2017-11-09 NOTE — LETTER
November 9, 2017      Dara Zavala  [address]   [place] [address]        To Whom It May Concern,       Patient was seen in office today for acute illness.     Sincerely,        Ondina Desai PA-C

## 2017-11-13 ENCOUNTER — TELEPHONE (OUTPATIENT)
Dept: FAMILY MEDICINE | Facility: CLINIC | Age: 37
End: 2017-11-13

## 2017-11-13 NOTE — TELEPHONE ENCOUNTER
"Its good that she's coughing stuff up, that's the goal - albuterol and mucinex will help make this more bearable.  Prescription for antibiotic stays in system for 10 days even though only 5 day course and covers most things we would be worried about.  Keep pushing fluids and rest.  Thanks  Ondina \"Nba\" LISA Desai   "

## 2017-11-13 NOTE — TELEPHONE ENCOUNTER
"Nba,  --This patient saw you last Thursday 11/9/17.  --She says her symptoms are exactly the same as when she saw Nba.  --\"Still hacking like crazy, still gets phlegm.\"  --She started abx on Friday. She has taken 3 out of the 5 pills.    --She is wondering if you want to order something else.    Candy Holder RN      "

## 2017-11-13 NOTE — TELEPHONE ENCOUNTER
I called patient and read Nba's msg to her.  She agrees with plan.  I told her to let us know if her symptoms do become worse, or persists without any improvement over the next several days.    Candy Holder RN

## 2017-11-13 NOTE — TELEPHONE ENCOUNTER
Reason for Call:  Medication or medication refill:    Do you use a [place] [facility]?  Name of the pharmacy and phone number for the current request:  [facility]/PHARMACY #[id number] - [facility] [address]    Name of the medication requested: azithromycin (ZITHROMAX) 250 MG tablet    Other request: Patient states the medication above has not helped her symptoms of bronchitis at all and states she is no better. She is wondering what PCP suggests - if she should continue on this medication or if something else can be prescribed. Please advise. Thanks!    Can we leave a detailed message on this number? YES    Phone number patient can be reached at: Home number on file [phone] (home)    Best Time: Any    Call taken on 11/13/2017 at 8:37 AM by Sari Ojeda

## 2018-02-06 ENCOUNTER — OFFICE VISIT (OUTPATIENT)
Dept: FAMILY MEDICINE | Facility: CLINIC | Age: 38
End: 2018-02-06
Payer: COMMERCIAL

## 2018-02-06 VITALS
HEART RATE: 86 BPM | WEIGHT: 145 LBS | BODY MASS INDEX: 25.69 KG/M2 | RESPIRATION RATE: 14 BRPM | OXYGEN SATURATION: 98 % | DIASTOLIC BLOOD PRESSURE: 71 MMHG | SYSTOLIC BLOOD PRESSURE: 130 MMHG | TEMPERATURE: 99.1 F

## 2018-02-06 DIAGNOSIS — R11.0 NAUSEA: ICD-10-CM

## 2018-02-06 DIAGNOSIS — R19.7 DIARRHEA, UNSPECIFIED TYPE: Primary | ICD-10-CM

## 2018-02-06 PROCEDURE — 99214 OFFICE O/P EST MOD 30 MIN: CPT | Performed by: FAMILY MEDICINE

## 2018-02-06 NOTE — MR AVS SNAPSHOT
After Visit Summary   2/6/2018    Dara Zavala    MRN: [id number]           Patient Information     Date Of Birth          1980        Visit Information        Provider Department      2/6/2018 4:00 PM Tammie Garcia MD [facility]        Today's Diagnoses     Diarrhea, unspecified type    -  1    Nausea           Follow-ups after your visit        Future tests that were ordered for you today     Open Future Orders        Priority Expected Expires Ordered    Enteric Bacteria and Virus Panel by AUDIE Stool Routine  2/6/2019 2/6/2018            Who to contact     If you have questions or need follow up information about today's clinic visit or your schedule please contact [facility] directly at [phone].  Normal or non-critical lab and imaging results will be communicated to you by MyChart, letter or phone within 4 business days after the clinic has received the results. If you do not hear from us within 7 days, please contact the clinic through Syncurityhart or phone. If you have a critical or abnormal lab result, we will notify you by phone as soon as possible.  Submit refill requests through Panna or call your pharmacy and they will forward the refill request to us. Please allow 3 business days for your refill to be completed.          Additional Information About Your Visit        MyChart Information     Panna gives you secure access to your electronic health record. If you see a primary care provider, you can also send messages to your care team and make appointments. If you have questions, please call your primary care clinic.  If you do not have a primary care provider, please call [phone] and they will assist you.        Care EveryWhere ID     This is your Care EveryWhere ID. This could be used by other organizations to access your [place] medical records  [id number]        Your Vitals Were     Pulse Temperature Respirations Pulse Oximetry BMI  (Body Mass Index)       86 99.1  F (37.3  C) (Oral) 14 98% 25.69 kg/m2        Blood Pressure from Last 3 Encounters:   02/06/18 130/71   11/09/17 110/76   08/02/17 100/68    Weight from Last 3 Encounters:   02/06/18 145 lb (65.8 kg)   11/09/17 135 lb 8 oz (61.5 kg)   08/02/17 136 lb 8 oz (61.9 kg)               Primary Care Provider Office Phone # Fax #    Patricia Faustin [phone]       [address] [facility] [address]  [facility] [address]        Equal Access to Services     [facility] CenterRERE : Hadii dominga coats hadromano Soalexa, waaxda luqadaha, qaybta kaalmada adeerlinyada, alma delia chapman . So [facility] [phone].    ATENCIÓN: Si habla español, tiene a newell disposición servicios gratuitos de asistencia lingüística. Llame al [phone].    We comply with applicable federal civil rights laws and [place] laws. We do not discriminate on the basis of race, color, national origin, age, disability, sex, sexual orientation, or gender identity.            Thank you!     Thank you for choosing [facility]  for your care. Our goal is always to provide you with excellent care. Hearing back from our patients is one way we can continue to improve our services. Please take a few minutes to complete the written survey that you may receive in the mail after your visit with us. Thank you!             Your Updated Medication List - Protect others around you: Learn how to safely use, store and throw away your medicines at www.disposemymeds.org.          This list is accurate as of 2/6/18  4:47 PM.  Always use your most recent med list.                   Brand Name Dispense Instructions for use Diagnosis    albuterol 108 (90 BASE) MCG/ACT Inhaler    PROAIR HFA/PROVENTIL HFA/VENTOLIN HFA    1 Inhaler    Inhale 2 puffs into the lungs every 6 hours as needed for shortness of breath / dyspnea or wheezing    Acute bronchitis, unspecified organism       azithromycin 250 MG tablet    ZITHROMAX     6 tablet    Two tablets first day, then one tablet daily for four days.    Acute bronchitis, unspecified organism       Multi-vitamin Tabs tablet     100 tablet    Take 1 tablet by mouth daily        norgestim-eth estrad triphasic 0.18/0.215/0.25 MG-25 MCG per tablet    ORTHO TRI-CYCLEN LO    84 tablet    Take 1 tablet by mouth daily    Encounter for surveillance of contraceptive pills

## 2018-02-06 NOTE — LETTER
February 6, 2018      Dara Zavala  [address]   [place] [address]        To Whom It May Concern,        Please excuse Dara from work due to illness.            Sincerely,        Tammie Garcia MD

## 2018-02-06 NOTE — PROGRESS NOTES
SUBJECTIVE:   Dara Zavala is a 37 year old female who presents to clinic today for the following health issues:      Diarrhea      Accompanying signs and symptoms:       Nausea/vomitting: YES, mild nausea        Abdominal pain: YES, mild to moderate        Weight loss: YES, little bit     History (recent antibiotics or travel/ill contacts/med changes/testing done): no     Precipitating or alleviating factors: None    Therapies tried and outcome: baking soda water       Sunday night pt went to a super bowl party and she had some food. At night she had diarrhea. Over the last 24 hours, pt is going to the bathroom eery 1.5-2 hours, stool are loose. No fevers but has been taking Iburofen since yesterday morning. No hematochezia, melena or vomiting.   Appetite decreased and trying to stay hydrated.     Problem list and histories reviewed & adjusted, as indicated.  Additional history: as documented    Labs reviewed in EPIC    Reviewed and updated as needed this visit by clinical staff  Tobacco  Allergies  Meds  Med Hx  Surg Hx  Fam Hx  Soc Hx      Reviewed and updated as needed this visit by Provider         ROS:  Constitutional, HEENT, cardiovascular, pulmonary, gi and gu systems are negative, except as otherwise noted.    OBJECTIVE:     /71  Pulse 86  Temp 99.1  F (37.3  C) (Oral)  Resp 14  Wt 145 lb (65.8 kg)  SpO2 98%  BMI 25.69 kg/m2  Body mass index is 25.69 kg/(m^2).  GENERAL: healthy, alert and no distress   EYES: Eyes grossly normal to inspection  HENT: nose and mouth without ulcers or lesions  RESP: lungs clear to auscultation - no rales, rhonchi or wheezes  CV: regular rate and rhythm, normal S1 S2  ABDOMEN: soft, + diffuse mild tender, no hepatosplenomegaly, no masses and bowel sounds normal, no guarding, no ridigity     Diagnostic Test Results:  none     ASSESSMENT/PLAN:     1. Diarrhea, unspecified type  - d/d include bacterial vs viral  - due to continued symptoms will obtain stool  testing and tx as indicated  - recommended lots of fluid including gatorade, bland diet, tylenol instead of ibuprofen   - Enteric Bacteria and Virus Panel by AUDIE Stool; Future  - Follow if symptoms worsen or fail to improve.    2. Nausea  - offered zofran, pt declined, would like to do more natural remedies  - recommended using lemon or michael   - Follow if symptoms worsen or fail to improve.    Tammie Garcia MD  [facility]

## 2018-03-20 ENCOUNTER — OFFICE VISIT (OUTPATIENT)
Dept: OBGYN | Facility: CLINIC | Age: 38
End: 2018-03-20
Payer: COMMERCIAL

## 2018-03-20 VITALS
SYSTOLIC BLOOD PRESSURE: 125 MMHG | HEART RATE: 88 BPM | BODY MASS INDEX: 25.69 KG/M2 | DIASTOLIC BLOOD PRESSURE: 86 MMHG | WEIGHT: 145 LBS

## 2018-03-20 DIAGNOSIS — Z30.09 CONSULTATION FOR FEMALE STERILIZATION: Primary | ICD-10-CM

## 2018-03-20 PROCEDURE — 99202 OFFICE O/P NEW SF 15 MIN: CPT | Performed by: OBSTETRICS & GYNECOLOGY

## 2018-03-20 NOTE — NURSING NOTE
"Chief Complaint   Patient presents with     Consult       Initial /86  Pulse 88  Wt 145 lb (65.8 kg)  LMP 02/25/2018  BMI 25.69 kg/m2 Estimated body mass index is 25.69 kg/(m^2) as calculated from the following:    Height as of 8/2/17: 5' 3\" (1.6 m).    Weight as of this encounter: 145 lb (65.8 kg).  BP completed using cuff size: regular    No obstetric history on file.    The following HM Due: NONE      The following patient reported/Care Every where data was sent to:  P ABSTRACT QUALITY INITIATIVES [09427]        N/a      Maribel Galloway MA                "

## 2018-03-20 NOTE — MR AVS SNAPSHOT
After Visit Summary   3/20/2018    Dara Zavala    MRN: [id number]           Patient Information     Date Of Birth          1980        Visit Information        Provider Department      3/20/2018 10:00 AM Viky Gonzalez MD [facility]        Today's Diagnoses     Consultation for female sterilization    -  1       Follow-ups after your visit        Who to contact     If you have questions or need follow up information about today's clinic visit or your schedule please contact [facility] directly at [phone].  Normal or non-critical lab and imaging results will be communicated to you by Retroficiencyhart, letter or phone within 4 business days after the clinic has received the results. If you do not hear from us within 7 days, please contact the clinic through NeuroSigmat or phone. If you have a critical or abnormal lab result, we will notify you by phone as soon as possible.  Submit refill requests through Stripe or call your pharmacy and they will forward the refill request to us. Please allow 3 business days for your refill to be completed.          Additional Information About Your Visit        MyChart Information     Stripe gives you secure access to your electronic health record. If you see a primary care provider, you can also send messages to your care team and make appointments. If you have questions, please call your primary care clinic.  If you do not have a primary care provider, please call [phone] and they will assist you.        Care EveryWhere ID     This is your Care EveryWhere ID. This could be used by other organizations to access your [facility] medical records  [id number]        Your Vitals Were     Pulse Last Period BMI (Body Mass Index)             88 02/25/2018 25.69 kg/m2          Blood Pressure from Last 3 Encounters:   03/20/18 125/86   02/06/18 130/71   11/09/17 110/76    Weight from Last 3 Encounters:   03/20/18 145 lb (65.8 kg)    02/06/18 145 lb (65.8 kg)   11/09/17 135 lb 8 oz (61.5 kg)              We Performed the Following     Dang-Operative Worksheet        Primary Care Provider Office Phone # Fax #    Patricia Faustin [phone]       [address] [place] [facility] [address]  [facility] [address]        Equal Access to Services     : Hadii aad ku hadasho Soomaali, waaxda luqadaha, qaybta kaalmada adeegyada, waxay idiin hayaan adeeg kharash la'aan . So [facility] [phone].    ATENCIÓN: Si habla español, tiene a newell disposición servicios gratuitos de asistencia lingüística. Sophiaame al [phone].    We comply with applicable federal civil rights laws and [place] laws. We do not discriminate on the basis of race, color, national origin, age, disability, sex, sexual orientation, or gender identity.            Thank you!     Thank you for choosing [facility]  for your care. Our goal is always to provide you with excellent care. Hearing back from our patients is one way we can continue to improve our services. Please take a few minutes to complete the written survey that you may receive in the mail after your visit with us. Thank you!             Your Updated Medication List - Protect others around you: Learn how to safely use, store and throw away your medicines at www.disposemymeds.org.      Notice  As of 3/20/2018 10:01 PM    You have not been prescribed any medications.

## 2018-03-20 NOTE — PROGRESS NOTES
GYN Clinic Visit  3/20/2018    CC: permanent sterilization consult    HPI:  Dara Zavala is a 37 year old who presents to discuss permanent sterilization.  She is current using natural family planning for contraception and she has used OCPs for contraception in the past. Dara Zavala is 100% certain she desires no future pregnancies. She is aware of reversible methods of contraception and is not interested in those methods. She understands that permanent sterilization is meant to be permanent and is NOT reversible. She desires tubal fulguration because she does not desire any foreign material in her body.     x1  TAB x2    Ob History:  Obstetric History       T1      L1     SAB0   TAB2   Ectopic0   Multiple0   Live Births1       # Outcome Date GA Lbr Ed/2nd Weight Sex Delivery Anes PTL Lv   3 Term 1996    F    KIMBERLY   2 TAB            1 TAB                   Gyn History:  LMP: Patient's last menstrual period was 2018.  Menses: occur every 28-30 days for 4 days  Last Pap: 5/13/15 NIL  History of abnormal Paps: no  History of cervical procedures: no    No past medical history on file.    No past surgical history on file.    No current outpatient prescriptions on file.     No current facility-administered medications for this visit.        Allergies   Allergen Reactions     Penicillins      rash       Family History   Problem Relation Age of Onset     Alcohol/Drug Mother      alcohol     Other - See Comments Father      accident-electricuted     Other - See Comments Maternal Grandmother      MVA     Alcohol/Drug Maternal Grandfather      drinker       Social History     Social History     Marital status: Single     Spouse name: N/A     Number of children: N/A     Years of education: N/A     Occupational History     Not on file.     Social History Main Topics     Smoking status: Current Every Day Smoker     Types: Cigarettes     Smokeless tobacco: Never Used     Alcohol use Yes       Comment: 4-6 beers 2-3 x weekly     Drug use: No     Sexual activity: Yes     Partners: Male     Birth control/ protection: Pill     Other Topics Concern     Not on file     Social History Narrative   Works as a dental hygienist. Feels safe.    Review of Systems  Gen: no change in weight, no fever, no chills, no fatigue  CV: no palpitations, no chest pain, no hypertension, no syncope  Resp: no shortness of breath, no cough, no wheezing, no asthma  GI: no nausea, no vomiting, no diarrhea, no constipation, no bloating, no GERD  :  no vaginal discharge, no dysuria, no abnormal bleeding, no pelvic pain   Endo: no thyroid problems, no cold/heat intolerance, no acne, no hirsutism, no diabetes  Heme: no easy bruising or bleeding, no history of DVT/PE/CVA  Neuro: no headaches, no seizures, no strokes, no focal deficits    OBJECTIVE:    Vitals:    18 1020   BP: 125/86   Pulse: 88   Weight: 145 lb (65.8 kg)     Body mass index is 25.69 kg/(m^2).  Gen: alert, oriented, no distress      ASSESSMENT:   Dara Zavala is a 37 year old  who desires permanent sterilization. Reviewed alternative methods of contraception, patient not interested in these. Discussed laparoscopic tubal ligation and hysteroscopic tubal occlusion. Patient desires laparoscopic tubal fulguration.      PLAN:  Laparoscopic bilateral tubal ligation discussed with patient. Discussed option of bilateral salpingectomy, with theoretical benefit of decreasing potential risk of ovarian cancer. She will think about it but is leaning towards fulguration. Procedure was discussed in detail as well as anticipated recovery period. Risks of bleeding, infection, damage to abdominal organs/blood vessels, as well as risk of failure, ectopic pregnancy were discussed in detail. Reviewed that this is a permanent procedure and not reversible. The patient understands that this procedure would require general anesthesia and be associated with risks with general  anesthesia. She understands the procedure lasts approximately 30 to 60 minutes and she should expect an approximately 2-week recovery from the procedure afterwards. The patient understands this is an outpatient procedure. She will present on the day of surgery and be discharged home later that day and need some one to drive her home. We also reviewed possible risk of regret. The patient declines other birth control methods.  The Essure procedure was also reviewed. All of the patient's questions were answered in the clinic today. She was given ACOG handouts on sterilization. Again, the patient was asked to call the clinic with any questions or concerns.  Our surgical scheduler will call patient to schedule procedure. She will have pre-op appt with her PCP. She is fully insured with private insurance so federal papers were not indicated. She is aware she is due for a pap smear this year but declines exam today.    Over 50% of this 20 min appt was spent in face to face counseling about contraception and permanent sterilization.    Viky Gonzalez MD

## 2018-04-24 ENCOUNTER — TELEPHONE (OUTPATIENT)
Dept: OBGYN | Facility: CLINIC | Age: 38
End: 2018-04-24

## 2018-04-24 NOTE — TELEPHONE ENCOUNTER
Type of surgery: Gyn  Location of surgery: [facility] OR  Date and time of surgery: n/a  Surgeon: KERRY Gonzalez  Pre-Op Appt Date: na  Post-Op Appt Date: na   Packet sent out: No  Pre-cert/Authorization completed:  Not Applicable  Date: 4/24/2018    Pt has decided to contact me early June to schedule her procedure in the future.    Jennifer Joyner MA

## 2018-06-05 ENCOUNTER — OFFICE VISIT (OUTPATIENT)
Dept: FAMILY MEDICINE | Facility: CLINIC | Age: 38
End: 2018-06-05
Payer: COMMERCIAL

## 2018-06-05 ENCOUNTER — TELEPHONE (OUTPATIENT)
Dept: FAMILY MEDICINE | Facility: CLINIC | Age: 38
End: 2018-06-05

## 2018-06-05 VITALS
HEIGHT: 63 IN | HEART RATE: 76 BPM | DIASTOLIC BLOOD PRESSURE: 72 MMHG | RESPIRATION RATE: 16 BRPM | SYSTOLIC BLOOD PRESSURE: 122 MMHG | WEIGHT: 154.5 LBS | BODY MASS INDEX: 27.38 KG/M2 | OXYGEN SATURATION: 99 %

## 2018-06-05 DIAGNOSIS — Z12.4 SCREENING FOR CERVICAL CANCER: ICD-10-CM

## 2018-06-05 DIAGNOSIS — N76.0 BACTERIAL VAGINITIS: ICD-10-CM

## 2018-06-05 DIAGNOSIS — N93.0 PCB (POST COITAL BLEEDING): Primary | ICD-10-CM

## 2018-06-05 DIAGNOSIS — Z11.3 SCREEN FOR STD (SEXUALLY TRANSMITTED DISEASE): ICD-10-CM

## 2018-06-05 DIAGNOSIS — B96.89 BACTERIAL VAGINITIS: ICD-10-CM

## 2018-06-05 LAB
BETA HCG QUAL IFA URINE: NEGATIVE
SPECIMEN SOURCE: ABNORMAL
WET PREP SPEC: ABNORMAL

## 2018-06-05 PROCEDURE — 84703 CHORIONIC GONADOTROPIN ASSAY: CPT | Performed by: FAMILY MEDICINE

## 2018-06-05 PROCEDURE — 86696 HERPES SIMPLEX TYPE 2 TEST: CPT | Performed by: FAMILY MEDICINE

## 2018-06-05 PROCEDURE — 87624 HPV HI-RISK TYP POOLED RSLT: CPT | Performed by: FAMILY MEDICINE

## 2018-06-05 PROCEDURE — 87210 SMEAR WET MOUNT SALINE/INK: CPT | Performed by: FAMILY MEDICINE

## 2018-06-05 PROCEDURE — 87591 N.GONORRHOEAE DNA AMP PROB: CPT | Performed by: FAMILY MEDICINE

## 2018-06-05 PROCEDURE — 86803 HEPATITIS C AB TEST: CPT | Performed by: FAMILY MEDICINE

## 2018-06-05 PROCEDURE — 87340 HEPATITIS B SURFACE AG IA: CPT | Performed by: FAMILY MEDICINE

## 2018-06-05 PROCEDURE — 99214 OFFICE O/P EST MOD 30 MIN: CPT | Performed by: FAMILY MEDICINE

## 2018-06-05 PROCEDURE — 87491 CHLMYD TRACH DNA AMP PROBE: CPT | Performed by: FAMILY MEDICINE

## 2018-06-05 PROCEDURE — 86695 HERPES SIMPLEX TYPE 1 TEST: CPT | Performed by: FAMILY MEDICINE

## 2018-06-05 PROCEDURE — G0145 SCR C/V CYTO,THINLAYER,RESCR: HCPCS | Performed by: FAMILY MEDICINE

## 2018-06-05 PROCEDURE — 36415 COLL VENOUS BLD VENIPUNCTURE: CPT | Performed by: FAMILY MEDICINE

## 2018-06-05 PROCEDURE — 86780 TREPONEMA PALLIDUM: CPT | Performed by: FAMILY MEDICINE

## 2018-06-05 RX ORDER — METRONIDAZOLE 7.5 MG/G
1 GEL VAGINAL AT BEDTIME
Qty: 35 G | Refills: 0 | Status: SHIPPED | OUTPATIENT
Start: 2018-06-05 | End: 2018-06-12

## 2018-06-05 NOTE — PROGRESS NOTES
Everything looks good except they did see 'clue cells' on the vaginal swab.  This CAN be a sign of bacterial vaginosis, which is more of a shift in the vaginal akil than an actual infection (and it is NOT an STD).  We usually only treat it if you are having increased vaginal discharge, vaginal irritation, or a change in your vaginal odor.  It is one of the most common cause of vaginitis.  If you are having symptoms- .I do recommend treatment with metronidazole vaginally  Once daily 7 days    Approximately 50 to 75 percent of women with BV are asymptomatic  Follow up as needed  .

## 2018-06-05 NOTE — TELEPHONE ENCOUNTER
Patient scheduled with NEIL for today at 440pm for bleeding after intercourse    JS said pt due for pap - can do pap today and if negative or positive, he'd still refer pt to OB for f/u    Would be best if patient see OB instead of him     Left message for patient to callback.  Mitzy ORTIZ RN

## 2018-06-05 NOTE — TELEPHONE ENCOUNTER
Message relayed to pt she stated she would like to just have STD testing and pregnancy testing done.   That was the only time slot that worked for her.

## 2018-06-05 NOTE — PROGRESS NOTES
"  SUBJECTIVE:   Dara Zavala is a 37 year old female who presents to clinic today for the following health issues:  post coital bleeding with a new partner past 2 months.  No dyspareunia. No pelvic cramping.  Requesting repeat home urine pregnancy test.  Remote history of tampons lost in vagina.  Wondering if since that often easily gets bacterial vaginosis  Would like complete sexually transmitted infection checks    Was on oral contraceptive pills- stopped few months ago.  Periods normal  Multiple home urine pregnancy test negative  Had plans for TL, was not able to find time off of work so canceled    Also over due for pap   Remote history of ab pap- but none for past 10 yrs    Chief Complaint   Patient presents with     Abnormal Bleeding Problem     Bleeding after intercourse x2 months     STD     Would like STD testing today including HIV and Hep C         PROBLEMS TO ADD ON...    Problem list and histories reviewed & adjusted, as indicated.  Additional history: as documented    There is no problem list on file for this patient.    No past surgical history on file.    Social History   Substance Use Topics     Smoking status: Current Every Day Smoker     Types: Cigarettes     Smokeless tobacco: Never Used     Alcohol use Yes      Comment: 4-6 beers 2-3 x weekly     Family History   Problem Relation Age of Onset     Alcohol/Drug Mother      alcohol     Other - See Comments Father      accident-electricuted     Other - See Comments Maternal Grandmother      MVA     Alcohol/Drug Maternal Grandfather      drinker           Reviewed and updated as needed this visit by clinical staff  Tobacco  Allergies  Meds       Reviewed and updated as needed this visit by Provider         ROS:  Constitutional, HEENT, cardiovascular, pulmonary, gi and gu systems are negative, except as otherwise noted.    OBJECTIVE:     /72  Pulse 76  Resp 16  Ht 5' 3\" (1.6 m)  Wt 154 lb 8 oz (70.1 kg)  LMP 05/13/2018 (Approximate) "  SpO2 99%  Breastfeeding? No  BMI 27.37 kg/m2  Body mass index is 27.37 kg/(m^2).  GENERAL: healthy, alert and no distress  NECK: no adenopathy, no asymmetry, masses, or scars and thyroid normal to palpation  RESP: lungs clear to auscultation - no rales, rhonchi or wheezes  CV: regular rate and rhythm, normal S1 S2, no S3 or S4, no murmur, click or rub, no peripheral edema and peripheral pulses strong  ABDOMEN: soft, nontender, no hepatosplenomegaly, no masses and bowel sounds normal   (female): normal female external genitalia, normal urethral meatus, vaginal mucosa, normal cervix/adnexa/uterus without masses . Friable cervix. Pap is sent. Chlamydia & gonorrhea & wet prep  MS: no gross musculoskeletal defects noted, no edema    Diagnostic Test Results:  Results for orders placed or performed in visit on 06/05/18 (from the past 24 hour(s))   Wet prep   Result Value Ref Range    Specimen Description Vagina     Wet Prep No Trichomonas seen     Wet Prep Clue cells seen (A)     Wet Prep No yeast seen        ASSESSMENT/PLAN:   1. PCB (post coital bleeding)  -negative urine pregnancy test  -pelvic exam within normal limits  -no tears or polyps seen in exam  -monitor symptoms  -not on oral contraceptive pills  -advised periodic urine pregnancy test  -will check for infection & also cervical cancer screening  -follow up as needed     - Chlamydia trachomatis PCR  - Neisseria gonorrhoeae PCR  - Wet prep  - Hepatitis C antibody  - Hepatitis B surface antigen  - Herpes Simplex Virus 1 and 2 IgG  - Treponema Abs w Reflex to RPR and Titer  - Pap imaged thin layer screen with HPV - recommended age 30 - 65 years (select HPV order below)  - HPV High Risk Types DNA Cervical    2. Screen for STD (sexually transmitted disease)    - Chlamydia trachomatis PCR  - Neisseria gonorrhoeae PCR  - Wet prep  - Hepatitis C antibody  - Hepatitis B surface antigen  - Herpes Simplex Virus 1 and 2 IgG  - Treponema Abs w Reflex to RPR and  Titer    3. Screening for cervical cancer    - Pap imaged thin layer screen with HPV - recommended age 30 - 65 years (select HPV order below)  - HPV High Risk Types DNA Cervical    4. bacterial vaginosis  -metronidazole vaginal bedtime for 7 nights    5.Family planning  Advised home urine pregnancy test time to time  She had plans for TL but was not able to get time off- she is looking into it     Total time with patient today was 25 minutes, more than 15 minutes spent in counseling regarding interpretation of clinical signs and symptoms and going through differentials,additional diagnostic testing options,treatment plan and follow up recommendation.     Marixa Goode MD  [facility]

## 2018-06-05 NOTE — MR AVS SNAPSHOT
"              After Visit Summary   6/5/2018    Dara Zavala    MRN: [id number]           Patient Information     Date Of Birth          1980        Visit Information        Provider Department      6/5/2018 4:40 PM Marixa Goode MD [facility]        Today's Diagnoses     PCB (post coital bleeding)    -  1    Bacterial vaginitis        Screen for STD (sexually transmitted disease)        Screening for cervical cancer           Follow-ups after your visit        Who to contact     If you have questions or need follow up information about today's clinic visit or your schedule please contact [facility] directly at [phone].  Normal or non-critical lab and imaging results will be communicated to you by MyChart, letter or phone within 4 business days after the clinic has received the results. If you do not hear from us within 7 days, please contact the clinic through Flat.tohart or phone. If you have a critical or abnormal lab result, we will notify you by phone as soon as possible.  Submit refill requests through MaxTraffic or call your pharmacy and they will forward the refill request to us. Please allow 3 business days for your refill to be completed.          Additional Information About Your Visit        MyChart Information     MaxTraffic gives you secure access to your electronic health record. If you see a primary care provider, you can also send messages to your care team and make appointments. If you have questions, please call your primary care clinic.  If you do not have a primary care provider, please call [phone] and they will assist you.        Care EveryWhere ID     This is your Care EveryWhere ID. This could be used by other organizations to access your [facility] medical records  [id number]        Your Vitals Were     Pulse Respirations Height Last Period Pulse Oximetry Breastfeeding?    76 16 5' 3\" (1.6 m) 05/13/2018 (Approximate) 99% No    BMI (Body Mass Index)    "                27.37 kg/m2            Blood Pressure from Last 3 Encounters:   06/05/18 122/72   03/20/18 125/86   02/06/18 130/71    Weight from Last 3 Encounters:   06/05/18 154 lb 8 oz (70.1 kg)   03/20/18 145 lb (65.8 kg)   02/06/18 145 lb (65.8 kg)              We Performed the Following     Beta HCG Qual, Urine - [facility] and [place] ([id number])     Chlamydia trachomatis PCR     Hepatitis B surface antigen     Hepatitis C antibody     Herpes Simplex Virus 1 and 2 IgG     HPV High Risk Types DNA Cervical     Neisseria gonorrhoeae PCR     Pap imaged thin layer screen with HPV - recommended age 30 - 65 years (select HPV order below)     Treponema Abs w Reflex to RPR and Titer     Wet prep          Today's Medication Changes          These changes are accurate as of 6/5/18  5:40 PM.  If you have any questions, ask your nurse or doctor.               Start taking these medicines.        Dose/Directions    metroNIDAZOLE 0.75 % vaginal gel   Commonly known as:  METROGEL   Used for:  Bacterial vaginitis   Started by:  Marixa Goode MD        Dose:  1 applicator   Place 1 applicator (5 g) vaginally At Bedtime for 7 days   Quantity:  35 g   Refills:  0            Where to get your medicines      These medications were sent to [id number] [address] AT CORNER OF [address] [place] [address]     Phone:  [phone]     metroNIDAZOLE 0.75 % vaginal gel                Primary Care Provider Office Phone # Fax #    Patricia Faustin [phone]       [address] [facility] [address]  [facility] [address]        Equal Access to Services     [facility] AH: Hadii aad ku hadashivana Soalexa, waaxda luqadaha, qaybta kaalmada alma delia adams. So [facility] [phone].    ATENCIÓN: Si habla español, tiene a newell disposición servicios gratuitos de asistencia lingüística. Llame al [phone].    We comply with applicable  federal civil rights laws and [place] laws. We do not discriminate on the basis of race, color, national origin, age, disability, sex, sexual orientation, or gender identity.            Thank you!     Thank you for choosing [facility]  for your care. Our goal is always to provide you with excellent care. Hearing back from our patients is one way we can continue to improve our services. Please take a few minutes to complete the written survey that you may receive in the mail after your visit with us. Thank you!             Your Updated Medication List - Protect others around you: Learn how to safely use, store and throw away your medicines at www.disposemymeds.org.          This list is accurate as of 6/5/18  5:40 PM.  Always use your most recent med list.                   Brand Name Dispense Instructions for use Diagnosis    metroNIDAZOLE 0.75 % vaginal gel    METROGEL    35 g    Place 1 applicator (5 g) vaginally At Bedtime for 7 days    Bacterial vaginitis

## 2018-06-05 NOTE — TELEPHONE ENCOUNTER
Spoke with patient.  States she has a very hard time getting off work and today is a day she can be here by 4:40.  Will ask AS if she will see patient at the end of her day.      AS can see patient at 4:40 today.  Patient informed.  Candy Rudolph RN

## 2018-06-07 LAB
C TRACH DNA SPEC QL NAA+PROBE: NEGATIVE
HBV SURFACE AG SERPL QL IA: NONREACTIVE
HCV AB SERPL QL IA: NONREACTIVE
HSV1 IGG SERPL QL IA: >8 AI (ref 0–0.8)
HSV2 IGG SERPL QL IA: >8 AI (ref 0–0.8)
N GONORRHOEA DNA SPEC QL NAA+PROBE: NEGATIVE
SPECIMEN SOURCE: NORMAL
SPECIMEN SOURCE: NORMAL
T PALLIDUM AB SER QL: NONREACTIVE

## 2018-06-08 LAB
COPATH REPORT: NORMAL
PAP: NORMAL

## 2018-06-08 NOTE — PROGRESS NOTES
The only positive test is herpes simplex virus type 1 & type 2.  You have no ulcers in either region & no treatment is required  In case of any vesicles or painful papules on either region, we can discuss treatment.  The virus may remain positive for a long time, & its not from active or acute (new onset) infection  Rest of all sexually transmitted infection checked are negative for HIV, human immunodeficiency virus  and syphilis, chlamydia & gonorrhea, HEPATITIS B VIRUS  -HEPATITIS C VIRUS   , so that's great  Consistent use of protection prevent transmission of sexually transmitted infection -is mostly effective.  Please keep us posted with questions or concerns .      Best Regards,    Marixa Goode MD  [facility]  [phone]

## 2018-06-12 LAB
FINAL DIAGNOSIS: NORMAL
HPV HR 12 DNA CVX QL NAA+PROBE: NEGATIVE
HPV16 DNA SPEC QL NAA+PROBE: NEGATIVE
HPV18 DNA SPEC QL NAA+PROBE: NEGATIVE
SPECIMEN DESCRIPTION: NORMAL
SPECIMEN SOURCE CVX/VAG CYTO: NORMAL

## 2018-08-16 ENCOUNTER — OFFICE VISIT (OUTPATIENT)
Dept: FAMILY MEDICINE | Facility: CLINIC | Age: 38
End: 2018-08-16
Payer: COMMERCIAL

## 2018-08-16 VITALS
BODY MASS INDEX: 25.34 KG/M2 | WEIGHT: 143 LBS | DIASTOLIC BLOOD PRESSURE: 84 MMHG | OXYGEN SATURATION: 96 % | TEMPERATURE: 98.4 F | HEART RATE: 87 BPM | HEIGHT: 63 IN | RESPIRATION RATE: 16 BRPM | SYSTOLIC BLOOD PRESSURE: 128 MMHG

## 2018-08-16 DIAGNOSIS — F43.21 GRIEF: Primary | ICD-10-CM

## 2018-08-16 DIAGNOSIS — F43.23 ADJUSTMENT DISORDER WITH MIXED ANXIETY AND DEPRESSED MOOD: ICD-10-CM

## 2018-08-16 PROCEDURE — 99213 OFFICE O/P EST LOW 20 MIN: CPT | Performed by: FAMILY MEDICINE

## 2018-08-16 NOTE — PATIENT INSTRUCTIONS
Grief Reaction  Grief is the feeling that we all have when we lose someone or something that has been important in our life. Grief is an unavoidable and normal reaction to this loss, and can last from months to years. The amount of time depends on different factors. These include how close the person was to you, and how much support you have through the grief process. Symptoms can be both physical and emotional.  Physical reactions  Reactions to grief of a physical nature include:     Loss of appetite or overeating    Changes in weight    Trouble getting to sleep or staying asleep    Hair loss    Upset stomach, indigestion, heart burn, belly pain, cramping, diarrhea    Sense of trouble breathing    Trembling, shakiness  Emotional reactions  Reactions to grief of an emotional nature include:     Sadness    Anxiety    Feeling depressed or helpless    Difficulty concentrating    Detachment or withdrawal from those around you    Loss of interest in your normal life and work  Home care  Suggestions to care for yourself at home include the following:     Allow yourself to feel the pain of your loss. For some, this can be a key part of healing grief. Talk about your pain with others who understand. Share good memories that involve the person, pet, or possession you lost.    Take time for yourself. Make it a point to do things that you enjoy (gardening, walking in nature, going to a movie, etc.).    Take care of your physical body. Eat a balanced diet (low in saturated fat and high in fruits and vegetables) and establish an exercise plan at least 3 times a week for 30 minutes. Even mild-moderate exercise (like brisk walking) can make you feel better. Get plenty of sleep.    Don't use alcohol or drugs to cover your emotional pain. This only slows down the emotional healing process.    Don't isolate yourself from others. Have daily contact with family or friends. Talk about your loss to those closest to you.    For  additional support, meet with your , , or rabbi, a counselor or therapist, or your own healthcare provider.    Consider joining a grief support group. Ask your healthcare provider or our staff for information on how to find one in your area.    If you have been prescribed a medicine to help with your symptoms, take it only as directed. Do not use it with alcohol.  Follow-up care  Follow up with your healthcare provider, or as advised.  Call 911  Call 911 if any of these happen:    Trouble breathing    Very confused    Very drowsy or trouble awakening    Fainting or loss of consciousness    Rapid heart rate    Seizure    New chest pain that becomes more severe, lasts longer, or spreads into your shoulder, arm, neck, jaw, or back    Have suicidal thoughts, a suicide plan, and the means to carry out the plan; or serious thoughts of hurting someone else   When to seek medical advice  Call your healthcare provider right away if any of these happen:    Worsening symptoms    Unable to eat or sleep for three days in a row    Feeling extreme depression, fear, anxiety, or anger toward yourself or others    Feeling out of control    Feeling that you may try to harm yourself    Family or friends express concern over your behavior and ask you to get help  Date Last Reviewed: 10/1/2017    0993-1581 [employer]. [address] [place] [address], [place] [address]. All rights reserved. This information is not intended as a substitute for professional medical care. Always follow your healthcare professional's instructions.

## 2018-08-16 NOTE — LETTER
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     Patient's name: Dara Zavala    Provider's name and business address:  Patricia Faustin  [address] [address]  Phone: [phone]      PART A:  MEDICAL FACTS  1)  Approximate date condition commenced:  july, 2018     Probable duration of condition:  2-3 months     Dominic below as applicable:  Was the patient admitted for an overnight stay in a hospital, hospice, or residential medical care facility?  no.    Date(s) you treated the patient for condition: August 16, 2018     Will the patient need to have treatment visits at least twice per year due to the                     condition? yes    Was medication, other than over-the-counter medication prescribed?  no    Was the patient referred to other health care provider(s) for evaluation or treatment     (e.g., physical therapist)?  yes:  State the nature of such treatments and expected duration of treatment: mental health counseling       2)  Is the medical condition pregnancy?  no.      3)  Is the employee unable to perform any of his/her job functions due to the     condition: yes:  Identify the job functions the employee is unable to perform: interaction with customers      4)  Describe other relevant medical facts, if any, related to the condition which the employee seeks leave (such as medical facts may include symptoms, diagnosis, or any regimen of continuing treatment such as the use of specialized equipment):   She has had anxiety attacks and depression symptoms since her mom passed away.      PART B: AMOUNT OF LEAVE NEEDED  5)  Will the employee be incapacitated for a single continuous period of time due to his/her  medical condition, including any time for treatment and recovery?  no.    6)  Will the employee need to attend follow-up treatment appointments or work part-time or on a reduced schedule because of the employee's medical condition?  no.    7) Will the condition cause episodic flare ups periodically preventing the employee from performing his/her job functions? yes:  Is it medically necessary for the patient to be absent from work during the flare-ups?  yes:  Explain: periodica flates of anxiety causing her to not be able to work. also will have counseling appointment    Based upon the patient's medical history and your knowledge of the medical condition, estimate the frequency of flare-ups and the duration of related incapacity that the patient may have over the next six months (e.g., 1 episode every 3 months lasting 1-2 days):    Frequency: about 2 X week, lasting 1-8 hours.   Duration:       ADDITIONAL INFORMATION: IDENTIFY QUESTION NUMBER WITH YOUR ADDITIONAL ANSWER I would expect the symptoms to decrease in frequency over the coming 1-2 months. Will be starting counseling.      ===========================================    IF EMPLOYEE'S FAMILY MEMBER IS THE PATIENT, PLEASE COMPLETE SECTION BELOW:  N/A      13) Does the patient/family member need the employee to provide basic medical or personal needs, or for safety or transportation?      14)  If no, would the employee's presence to provide psychological comfort be beneficial to the patient or assist in the patient's recovery?        ================================================================    TO BE COMPLETED BY THE HEALTH CARE PROVIDER:    Signature:  Riley Negrete ________________________________________  Date:   8/16/2018

## 2018-08-16 NOTE — MR AVS SNAPSHOT
After Visit Summary   8/16/2018    Dara Zavala    MRN: [id number]           Patient Information     Date Of Birth          1980        Visit Information        Provider Department      8/16/2018 1:40 PM Riley Negrete MD [facility]        Today's Diagnoses     Grief    -  1    Adjustment disorder with mixed anxiety and depressed mood          Care Instructions      Grief Reaction  Grief is the feeling that we all have when we lose someone or something that has been important in our life. Grief is an unavoidable and normal reaction to this loss, and can last from months to years. The amount of time depends on different factors. These include how close the person was to you, and how much support you have through the grief process. Symptoms can be both physical and emotional.  Physical reactions  Reactions to grief of a physical nature include:     Loss of appetite or overeating    Changes in weight    Trouble getting to sleep or staying asleep    Hair loss    Upset stomach, indigestion, heart burn, belly pain, cramping, diarrhea    Sense of trouble breathing    Trembling, shakiness  Emotional reactions  Reactions to grief of an emotional nature include:     Sadness    Anxiety    Feeling depressed or helpless    Difficulty concentrating    Detachment or withdrawal from those around you    Loss of interest in your normal life and work  Home care  Suggestions to care for yourself at home include the following:     Allow yourself to feel the pain of your loss. For some, this can be a key part of healing grief. Talk about your pain with others who understand. Share good memories that involve the person, pet, or possession you lost.    Take time for yourself. Make it a point to do things that you enjoy (gardening, walking in nature, going to a movie, etc.).    Take care of your physical body. Eat a balanced diet (low in saturated fat and high in fruits and vegetables) and  establish an exercise plan at least 3 times a week for 30 minutes. Even mild-moderate exercise (like brisk walking) can make you feel better. Get plenty of sleep.    Don't use alcohol or drugs to cover your emotional pain. This only slows down the emotional healing process.    Don't isolate yourself from others. Have daily contact with family or friends. Talk about your loss to those closest to you.    For additional support, meet with your , , or rabbi, a counselor or therapist, or your own healthcare provider.    Consider joining a grief support group. Ask your healthcare provider or our staff for information on how to find one in your area.    If you have been prescribed a medicine to help with your symptoms, take it only as directed. Do not use it with alcohol.  Follow-up care  Follow up with your healthcare provider, or as advised.  Call 911  Call 911 if any of these happen:    Trouble breathing    Very confused    Very drowsy or trouble awakening    Fainting or loss of consciousness    Rapid heart rate    Seizure    New chest pain that becomes more severe, lasts longer, or spreads into your shoulder, arm, neck, jaw, or back    Have suicidal thoughts, a suicide plan, and the means to carry out the plan; or serious thoughts of hurting someone else   When to seek medical advice  Call your healthcare provider right away if any of these happen:    Worsening symptoms    Unable to eat or sleep for three days in a row    Feeling extreme depression, fear, anxiety, or anger toward yourself or others    Feeling out of control    Feeling that you may try to harm yourself    Family or friends express concern over your behavior and ask you to get help  Date Last Reviewed: 10/1/2017    6386-7166 [employer]. [address] [place] [address], [place] [address]. All rights reserved. This information is not intended as a substitute for professional medical care. Always follow your healthcare professional's  instructions.                Follow-ups after your visit        Additional Services     MENTAL HEALTH REFERRAL  - Adult; Outpatient Treatment; Individual/Couples/Family/Group Therapy/Health Psychology; [facility]: [facility] [phone]; We will contact you to schedule the appointment or please call with any questions       All scheduling is subject to the client's specific insurance plan & benefits, provider/location availability, and provider clinical specialities.  Please arrive 15 minutes early for your first appointment and bring your completed paperwork.    Please be aware that coverage of these services is subject to the terms and limitations of your health insurance plan.  Call member services at your health plan with any benefit or coverage questions.                            Your next 10 appointments already scheduled     Aug 22, 2018 12:40 PM CDT   Office Visit with Marixa Goode MD   [facility] ([facility])    [address] [address] [phone]           Bring a current list of meds and any records pertaining to this visit. For Physicals, please bring immunization records and any forms needing to be filled out. Please arrive 10 minutes early to complete paperwork.              Who to contact     If you have questions or need follow up information about today's clinic visit or your schedule please contact [facility] directly at [phone].  Normal or non-critical lab and imaging results will be communicated to you by MyChart, letter or phone within 4 business days after the clinic has received the results. If you do not hear from us within 7 days, please contact the clinic through MyChart or phone. If you have a critical or abnormal lab result, we will notify you by phone as soon as possible.  Submit refill requests through [employer] or call your pharmacy and they will forward the refill request to us. Please  "allow 3 business days for your refill to be completed.          Additional Information About Your Visit        MyChart Information     [employer] gives you secure access to your electronic health record. If you see a primary care provider, you can also send messages to your care team and make appointments. If you have questions, please call your primary care clinic.  If you do not have a primary care provider, please call [phone] and they will assist you.        Care EveryWhere ID     This is your Care EveryWhere ID. This could be used by other organizations to access your [facility] medical records  [id number]        Your Vitals Were     Pulse Temperature Respirations Height Pulse Oximetry BMI (Body Mass Index)    87 98.4  F (36.9  C) (Oral) 16 5' 3\" (1.6 m) 96% 25.33 kg/m2       Blood Pressure from Last 3 Encounters:   08/16/18 128/84   06/05/18 122/72   03/20/18 125/86    Weight from Last 3 Encounters:   08/16/18 143 lb (64.9 kg)   06/05/18 154 lb 8 oz (70.1 kg)   03/20/18 145 lb (65.8 kg)              We Performed the Following     MENTAL HEALTH REFERRAL  - Adult; Outpatient Treatment; Individual/Couples/Family/Group Therapy/Health Psychology; [facility]: [facility] [phone]; We will contact you to schedule the appointment or please call with any questions        Primary Care Provider Office Phone # Fax #    Patricia Faustin [phone]       [address]  [place] [id number]        Equal Access to Services     [facility]: Hadii aad ku hadasho Soomaali, waaxda luqadaha, qaybta kaalmada adeegyada, alma delia saul. So [facility] [phone].    ATENCIÓN: Si habla español, tiene a newell disposición servicios gratuitos de asistencia lingüística. Llame al [phone].    We comply with applicable federal civil rights laws and [place] laws. We do not discriminate on the basis of race, color, national origin, age, disability, sex, sexual " orientation, or gender identity.            Thank you!     Thank you for choosing [facility]  for your care. Our goal is always to provide you with excellent care. Hearing back from our patients is one way we can continue to improve our services. Please take a few minutes to complete the written survey that you may receive in the mail after your visit with us. Thank you!             Your Updated Medication List - Protect others around you: Learn how to safely use, store and throw away your medicines at www.disposemymeds.org.      Notice  As of 8/16/2018  2:18 PM    You have not been prescribed any medications.

## 2018-08-16 NOTE — PROGRESS NOTES
"  SUBJECTIVE:   Dara Zavala is a 37 year old female who presents to clinic today for the following health issues:      Abnormal Mood Symptoms  Onset: one month     Description:   Depression: YES  Anxiety: YES    Accompanying Signs & Symptoms:  Still participating in activities that you used to enjoy: YES  Fatigue: YES  Irritability: no   Difficulty concentrating: YES  Changes in appetite: YES  Problems with sleep: YES  Heart racing/beating fast : YES  Thoughts of hurting yourself or others: none    History:   Recent stress: YES  Prior depression hospitalization: None  Family history of depression: YES  Family history of anxiety: no     Precipitating factors:   Alcohol/drug use: YES    Alleviating factors:  Mother past away last month     Therapies Tried and outcome: None    Having trouble working at times due to crying spells and panicky symptoms. She has been smoking more but not drinking. She has been exercising and connected with family and friends.     She has decreased appetite, trouble with sleep, crying spells anxiety. No suicidal symptoms/thoughts.     OBJECTIVE: /84  Pulse 87  Temp 98.4  F (36.9  C) (Oral)  Resp 16  Ht 5' 3\" (1.6 m)  Wt 143 lb (64.9 kg)  SpO2 96%  BMI 25.33 kg/m2 no apparent distress   Mental status exam:  Well-groomed, well-dressed, tearful mood congruent affect, no evidence of formal thought disorder, No SI, Speech normal.       ICD-10-CM    1. Grief F43.20 MENTAL HEALTH REFERRAL  - Adult; Outpatient Treatment; Individual/Couples/Family/Group Therapy/Health Psychology; [facility]: [facility] [phone]; We will contact you to schedule the appointment or please call with any questions   2. Adjustment disorder with mixed anxiety and depressed mood F43.23     discussed meds. She wishes to avoid meds. Will do counseling. [facility] letter written. follow up if worsening or within the next 2 moths if symptoms are not turningaround.       "

## 2018-10-19 ENCOUNTER — OFFICE VISIT (OUTPATIENT)
Dept: FAMILY MEDICINE | Facility: CLINIC | Age: 38
End: 2018-10-19

## 2018-10-19 VITALS
HEIGHT: 63 IN | WEIGHT: 147 LBS | DIASTOLIC BLOOD PRESSURE: 83 MMHG | HEART RATE: 100 BPM | SYSTOLIC BLOOD PRESSURE: 132 MMHG | OXYGEN SATURATION: 98 % | BODY MASS INDEX: 26.05 KG/M2 | TEMPERATURE: 98.2 F

## 2018-10-19 DIAGNOSIS — J06.9 UPPER RESPIRATORY TRACT INFECTION, UNSPECIFIED TYPE: Primary | ICD-10-CM

## 2018-10-19 DIAGNOSIS — R05.9 COUGH: ICD-10-CM

## 2018-10-19 PROBLEM — F17.200 SMOKER: Status: ACTIVE | Noted: 2018-10-19

## 2018-10-19 PROCEDURE — 99213 OFFICE O/P EST LOW 20 MIN: CPT | Performed by: PHYSICIAN ASSISTANT

## 2018-10-19 RX ORDER — ALBUTEROL SULFATE 90 UG/1
2 AEROSOL, METERED RESPIRATORY (INHALATION) EVERY 6 HOURS PRN
Qty: 1 INHALER | Refills: 0 | Status: SHIPPED | OUTPATIENT
Start: 2018-10-19 | End: 2019-06-07

## 2018-10-19 RX ORDER — AZITHROMYCIN 250 MG/1
TABLET, FILM COATED ORAL
Qty: 6 TABLET | Refills: 0 | Status: SHIPPED | OUTPATIENT
Start: 2018-10-19 | End: 2019-06-07

## 2018-10-19 RX ORDER — BENZONATATE 200 MG/1
200 CAPSULE ORAL 3 TIMES DAILY PRN
Qty: 21 CAPSULE | Refills: 0 | Status: SHIPPED | OUTPATIENT
Start: 2018-10-19 | End: 2019-06-07

## 2018-10-19 NOTE — LETTER
My Depression Action Plan  Name: Dara Zavala   Date of Birth 1980  Date: 10/19/2018    My doctor: Patricia Faustin   My clinic: [facility]  [address] [place] [place] [address] [phone]          GREEN    ZONE   Good Control    What it looks like:     Things are going generally well. You have normal up s and down s. You may even feel depressed from time to time, but bad moods usually last less than a day.   What you need to do:  1. Continue to care for yourself (see self care plan)  2. Check your depression survival kit and update it as needed  3. Follow your physician s recommendations including any medication.  4. Do not stop taking medication unless you consult with your physician first.           YELLOW         ZONE Getting Worse    What it looks like:     Depression is starting to interfere with your life.     It may be hard to get out of bed; you may be starting to isolate yourself from others.    Symptoms of depression are starting to last most all day and this has happened for several days.     You may have suicidal thoughts but they are not constant.   What you need to do:     1. Call your care team, your response to treatment will improve if you keep your care team informed of your progress. Yellow periods are signs an adjustment may need to be made.     2. Continue your self-care, even if you have to fake it!    3. Talk to someone in your support network    4. Open up your depression survival kit           RED    ZONE Medical Alert - Get Help    What it looks like:     Depression is seriously interfering with your life.     You may experience these or other symptoms: You can t get out of bed most days, can t work or engage in other necessary activities, you have trouble taking care of basic hygiene, or basic responsibilities, thoughts of suicide or death that will not go away, self-injurious behavior.     What you need to do:  1. Call your care team  and request a same-day appointment. If they are not available (weekends or after hours) call your local crisis line, emergency room or 911.            Depression Self Care Plan / Survival Kit    Self-Care for Depression  Here s the deal. Your body and mind are really not as separate as most people think.  What you do and think affects how you feel and how you feel influences what you do and think. This means if you do things that people who feel good do, it will help you feel better.  Sometimes this is all it takes.  There is also a place for medication and therapy depending on how severe your depression is, so be sure to consult with your medical provider and/ or Behavioral Health Consultant if your symptoms are worsening or not improving.     In order to better manage my stress, I will:    Exercise  Get some form of exercise, every day. This will help reduce pain and release endorphins, the  feel good  chemicals in your brain. This is almost as good as taking antidepressants!  This is not the same as joining a gym and then never going! (they count on that by the way ) It can be as simple as just going for a walk or doing some gardening, anything that will get you moving.      Hygiene   Maintain good hygiene (Get out of bed in the morning, Make your bed, Brush your teeth, Take a shower, and Get dressed like you were going to work, even if you are unemployed).  If your clothes don't fit try to get ones that do.    Diet  I will strive to eat foods that are good for me, drink plenty of water, and avoid excessive sugar, caffeine, alcohol, and other mood-altering substances.  Some foods that are helpful in depression are: complex carbohydrates, B vitamins, flaxseed, fish or fish oil, fresh fruits and vegetables.    Psychotherapy  I agree to participate in Individual Therapy (if recommended).    Medication  If prescribed medications, I agree to take them.  Missing doses can result in serious side effects.  I understand  that drinking alcohol, or other illicit drug use, may cause potential side effects.  I will not stop my medication abruptly without first discussing it with my provider.    Staying Connected With Others  I will stay in touch with my friends, family members, and my primary care provider/team.    Use your imagination  Be creative.  We all have a creative side; it doesn t matter if it s oil painting, sand castles, or mud pies! This will also kick up the endorphins.    Witness Beauty  (AKA stop and smell the roses) Take a look outside, even in mid-winter. Notice colors, textures. Watch the squirrels and birds.     Service to others  Be of service to others.  There is always someone else in need.  By helping others we can  get out of ourselves  and remember the really important things.  This also provides opportunities for practicing all the other parts of the program.    Humor  Laugh and be silly!  Adjust your TV habits for less news and crime-drama and more comedy.    Control your stress  Try breathing deep, massage therapy, biofeedback, and meditation. Find time to relax each day.     My support system    Clinic Contact:  Phone number:    Contact 1:  Phone number:    Contact 2:  Phone number:    Gnosticist/:  Phone number:    Therapist:  Phone number:    Local crisis center:    Phone number:    Other community support:  Phone number:

## 2018-10-19 NOTE — NURSING NOTE
"Chief Complaint   Patient presents with     URI     initial /83 (BP Location: Right arm)  Pulse 100  Temp 98.2  F (36.8  C) (Oral)  Ht 5' 3\" (1.6 m)  Wt 147 lb (66.7 kg)  SpO2 98%  BMI 26.04 kg/m2 Estimated body mass index is 26.04 kg/(m^2) as calculated from the following:    Height as of this encounter: 5' 3\" (1.6 m).    Weight as of this encounter: 147 lb (66.7 kg).  BP completed using cuff size: regular.   R arm      Health Maintenance that is potentially due pending provider review:  NONE    n/a    Kavon You ma  "

## 2018-10-19 NOTE — MR AVS SNAPSHOT
"              After Visit Summary   10/19/2018    Dara Zavala    MRN: [id number]           Patient Information     Date Of Birth          1980        Visit Information        Provider Department      10/19/2018 11:00 AM Dhruv Heard PA-C [facility]        Today's Diagnoses     Upper respiratory tract infection, unspecified type    -  1    Cough           Follow-ups after your visit        Follow-up notes from your care team     Return if symptoms worsen or fail to improve.      Who to contact     If you have questions or need follow up information about today's clinic visit or your schedule please contact [facility] directly at [phone].  Normal or non-critical lab and imaging results will be communicated to you by MyChart, letter or phone within 4 business days after the clinic has received the results. If you do not hear from us within 7 days, please contact the clinic through [employer] or phone. If you have a critical or abnormal lab result, we will notify you by phone as soon as possible.  Submit refill requests through [employer] or call your pharmacy and they will forward the refill request to us. Please allow 3 business days for your refill to be completed.          Additional Information About Your Visit        MyChart Information     [employer] gives you secure access to your electronic health record. If you see a primary care provider, you can also send messages to your care team and make appointments. If you have questions, please call your primary care clinic.  If you do not have a primary care provider, please call [phone] and they will assist you.        Care EveryWhere ID     This is your Care EveryWhere ID. This could be used by other organizations to access your [place] medical records  [id number]        Your Vitals Were     Pulse Temperature Height Pulse Oximetry BMI (Body Mass Index)       100 98.2  F (36.8  C) (Oral) 5' 3\" (1.6 m) 98% 26.04 kg/m2  "       Blood Pressure from Last 3 Encounters:   10/19/18 132/83   08/16/18 128/84   06/05/18 122/72    Weight from Last 3 Encounters:   10/19/18 147 lb (66.7 kg)   08/16/18 143 lb (64.9 kg)   06/05/18 154 lb 8 oz (70.1 kg)              Today, you had the following     No orders found for display         Today's Medication Changes          These changes are accurate as of 10/19/18 11:59 PM.  If you have any questions, ask your nurse or doctor.               Start taking these medicines.        Dose/Directions    albuterol 108 (90 Base) MCG/ACT inhaler   Commonly known as:  PROAIR HFA/PROVENTIL HFA/VENTOLIN HFA   Used for:  Upper respiratory tract infection, unspecified type, Cough   Started by:  Dhruv Heard PA-C        Dose:  2 puff   Inhale 2 puffs into the lungs every 6 hours as needed for shortness of breath / dyspnea or wheezing   Quantity:  1 Inhaler   Refills:  0       azithromycin 250 MG tablet   Commonly known as:  ZITHROMAX   Used for:  Upper respiratory tract infection, unspecified type   Started by:  Dhruv Heard PA-C        Two tablets first day, then one tablet daily for four days.   Quantity:  6 tablet   Refills:  0       benzonatate 200 MG capsule   Commonly known as:  TESSALON   Used for:  Cough   Started by:  Dhruv Heard PA-C        Dose:  200 mg   Take 1 capsule (200 mg) by mouth 3 times daily as needed for cough   Quantity:  21 capsule   Refills:  0            Where to get your medicines      Some of these will need a paper prescription and others can be bought over the counter.  Ask your nurse if you have questions.     Bring a paper prescription for each of these medications     albuterol 108 (90 Base) MCG/ACT inhaler    azithromycin 250 MG tablet    benzonatate 200 MG capsule                Primary Care Provider Office Phone # Fax #    Patricia Faustin [phone]       [address] [address]        Equal Access to  Services     [facility]: Hadii aad ku hadromanivana Lataalexa, waaxda luqadaha, qaybta kaalmamargarita adams, alma delia chapman . So [facility] [phone].    ATENCIÓN: Si habla español, tiene a newell disposición servicios gratuitos de asistencia lingüística. Llame al [phone].    We comply with applicable federal civil rights laws and [place] laws. We do not discriminate on the basis of race, color, national origin, age, disability, sex, sexual orientation, or gender identity.            Thank you!     Thank you for choosing [facility]  for your care. Our goal is always to provide you with excellent care. Hearing back from our patients is one way we can continue to improve our services. Please take a few minutes to complete the written survey that you may receive in the mail after your visit with us. Thank you!             Your Updated Medication List - Protect others around you: Learn how to safely use, store and throw away your medicines at www.disposemymeds.org.          This list is accurate as of 10/19/18 11:59 PM.  Always use your most recent med list.                   Brand Name Dispense Instructions for use Diagnosis    albuterol 108 (90 Base) MCG/ACT inhaler    PROAIR HFA/PROVENTIL HFA/VENTOLIN HFA    1 Inhaler    Inhale 2 puffs into the lungs every 6 hours as needed for shortness of breath / dyspnea or wheezing    Upper respiratory tract infection, unspecified type, Cough       azithromycin 250 MG tablet    ZITHROMAX    6 tablet    Two tablets first day, then one tablet daily for four days.    Upper respiratory tract infection, unspecified type       benzonatate 200 MG capsule    TESSALON    21 capsule    Take 1 capsule (200 mg) by mouth 3 times daily as needed for cough    Cough

## 2018-10-19 NOTE — LETTER
[facility]  [address]  [facility] [address]  Phone: [phone]    October 19, 2018        Dara Zavala  [address]  [facility] [address]          To whom it may concern:    RE: Dara Zavala    Patient was seen and treated today at our clinic and missed work 10/17-10/22    Please contact me for questions or concerns.      Sincerely,        Dhruv Heard PA-C

## 2018-10-19 NOTE — PROGRESS NOTES
"  SUBJECTIVE:   Dara Zavala is a 38 year old female who presents to clinic today for the following health issues:      RESPIRATORY SYMPTOMS      Duration: 5 days    Description  cough and sneezing    Severity: mild    Accompanying signs and symptoms: None    History (predisposing factors):  none    Precipitating or alleviating factors: None    Therapies tried and outcome:  rest and fluids          Problem list and histories reviewed & adjusted, as indicated.  Additional history: 39 y/o female c/o not feeling well for the last 5 days.  Admits to the above symptoms with productive cough being the worst.     BP Readings from Last 3 Encounters:   10/19/18 132/83   08/16/18 128/84   06/05/18 122/72    Wt Readings from Last 3 Encounters:   10/19/18 147 lb (66.7 kg)   08/16/18 143 lb (64.9 kg)   06/05/18 154 lb 8 oz (70.1 kg)                    Reviewed and updated as needed this visit by clinical staff  Tobacco  Allergies  Meds       Reviewed and updated as needed this visit by Provider         ROS:  Constitutional, HEENT, cardiovascular, pulmonary, gi and gu systems are negative, except as otherwise noted.    OBJECTIVE:     /83 (BP Location: Right arm)  Pulse 100  Temp 98.2  F (36.8  C) (Oral)  Ht 5' 3\" (1.6 m)  Wt 147 lb (66.7 kg)  SpO2 98%  BMI 26.04 kg/m2  Body mass index is 26.04 kg/(m^2).  GENERAL: alert and no distress  EYES: Eyes grossly normal to inspection  HENT: ear canals and TM's normal, nose and mouth without ulcers or lesions  NECK: no adenopathy, no asymmetry, masses, or scars and thyroid normal to palpation  RESP: lungs clear to auscultation - no rales, rhonchi or wheezes  CV: regular rate and rhythm, normal S1 S2, no S3 or S4, no murmur, click or rub, no peripheral edema and peripheral pulses strong  PSYCH: mentation appears normal, affect normal/bright    Diagnostic Test Results:  none     ASSESSMENT/PLAN:             1. Upper respiratory tract infection, unspecified type  Most likely " viral.  If symptoms persist or worsen past 10 days, would fill and take.  Albuterol and tessalon to help symptoms.    - azithromycin (ZITHROMAX) 250 MG tablet; Two tablets first day, then one tablet daily for four days.  Dispense: 6 tablet; Refill: 0  - albuterol (PROAIR HFA/PROVENTIL HFA/VENTOLIN HFA) 108 (90 Base) MCG/ACT inhaler; Inhale 2 puffs into the lungs every 6 hours as needed for shortness of breath / dyspnea or wheezing  Dispense: 1 Inhaler; Refill: 0    2. Cough    - benzonatate (TESSALON) 200 MG capsule; Take 1 capsule (200 mg) by mouth 3 times daily as needed for cough  Dispense: 21 capsule; Refill: 0  - albuterol (PROAIR HFA/PROVENTIL HFA/VENTOLIN HFA) 108 (90 Base) MCG/ACT inhaler; Inhale 2 puffs into the lungs every 6 hours as needed for shortness of breath / dyspnea or wheezing  Dispense: 1 Inhaler; Refill: 0    Follow up if symptoms persist or worsen     Dhruv Heard PA-C  [facility]

## 2018-10-20 ASSESSMENT — PATIENT HEALTH QUESTIONNAIRE - PHQ9: SUM OF ALL RESPONSES TO PHQ QUESTIONS 1-9: 5

## 2019-06-07 ENCOUNTER — OFFICE VISIT (OUTPATIENT)
Dept: FAMILY MEDICINE | Facility: CLINIC | Age: 39
End: 2019-06-07

## 2019-06-07 VITALS
TEMPERATURE: 97.8 F | HEART RATE: 94 BPM | WEIGHT: 149 LBS | OXYGEN SATURATION: 99 % | DIASTOLIC BLOOD PRESSURE: 65 MMHG | SYSTOLIC BLOOD PRESSURE: 132 MMHG | BODY MASS INDEX: 26.4 KG/M2 | HEIGHT: 63 IN

## 2019-06-07 DIAGNOSIS — M54.41 ACUTE RIGHT-SIDED LOW BACK PAIN WITH RIGHT-SIDED SCIATICA: Primary | ICD-10-CM

## 2019-06-07 PROCEDURE — 99213 OFFICE O/P EST LOW 20 MIN: CPT | Performed by: NURSE PRACTITIONER

## 2019-06-07 RX ORDER — CYCLOBENZAPRINE HCL 5 MG
5-10 TABLET ORAL 2 TIMES DAILY PRN
Qty: 30 TABLET | Refills: 1 | Status: SHIPPED | OUTPATIENT
Start: 2019-06-07 | End: 2019-10-09

## 2019-06-07 RX ORDER — IBUPROFEN 200 MG
600 TABLET ORAL EVERY 6 HOURS PRN
COMMUNITY

## 2019-06-07 ASSESSMENT — PATIENT HEALTH QUESTIONNAIRE - PHQ9: SUM OF ALL RESPONSES TO PHQ QUESTIONS 1-9: 0

## 2019-06-07 ASSESSMENT — MIFFLIN-ST. JEOR: SCORE: 1329.99

## 2019-06-07 NOTE — PROGRESS NOTES
Subjective     Dara Zavala is a 38 year old female who presents to clinic today for the following health issues:    HPI   Musculoskeletal problem/pain      Duration: 3 weeks, getting worse    Description  Location: Right Hip    Intensity:  severe    Accompanying signs and symptoms: radiation of pain to right thigh    History  Previous similar problem: no   Previous evaluation:  none    Precipitating or alleviating factors:  Trauma or overuse: YES, yard working  Aggravating factors include: Bending    Therapies tried and outcome: ice and Ibuprofen    Was doing yard work approximately 3 weeks ago and lifting heavy objects and felt pain and jolting sensation from her right lower back into right middle thigh. Was episodic now getting more constant.    Pain from her lower back radiates into her lateral right thigh. Straight leg makes pain worse, as well as cross leg raising. Walking worsens lower back pain as well.    Has been getting worse, took 600 mg Ibuprofen this AM with improvement.    No loss of bowel or bladder, no fever or chills.      No past medical history on file.  Family History   Problem Relation Age of Onset     Alcohol/Drug Mother         alcohol     Other - See Comments Father         accident-electricuted     Other - See Comments Maternal Grandmother         MVA     Alcohol/Drug Maternal Grandfather         drinker     No past surgical history on file.  Social History     Tobacco Use     Smoking status: Current Every Day Smoker     Types: Cigarettes     Smokeless tobacco: Never Used   Substance Use Topics     Alcohol use: Yes     Comment: 4-6 beers 2-3 x weekly     Current Outpatient Medications   Medication Sig Dispense Refill     ibuprofen (ADVIL/MOTRIN) 200 MG tablet Take 600 mg by mouth every 6 hours as needed for mild pain       Allergies   Allergen Reactions     Penicillins      rash       Reviewed and updated as needed this visit by clinical staff and provider     Review of Systems  "  Detailed as above       Objective    /65 (BP Location: Right arm, Patient Position: Sitting, Cuff Size: Adult Regular)   Pulse 94   Temp 97.8  F (36.6  C) (Oral)   Ht 1.6 m (5' 3\")   Wt 67.6 kg (149 lb)   LMP 05/24/2019 (Approximate)   SpO2 99%   BMI 26.39 kg/m    Body mass index is 26.39 kg/m .  Physical Exam   Constitutional: She appears well-developed.   Appears uncomfortable   HENT:   Head: Normocephalic.   Eyes: Conjunctivae are normal.   Pulmonary/Chest: Effort normal.   Musculoskeletal: She exhibits tenderness (R lumbar).   Right lower back pain with seated straight leg raises.  Contralateral right lower back pain with cross leg raise.  Slow to move with limited ROM of back   Neurological: She is alert.   Skin: Skin is warm and dry.   Psychiatric: She has a normal mood and affect. Judgment normal.          Assessment and Plan:       ICD-10-CM    1. Acute right-sided low back pain with right-sided sciatica M54.41 [facility]     cyclobenzaprine (FLEXERIL) 5 MG tablet     Patient with out any current red flags. No current loss of bowel or bladder. Will proceed with conservative management.  Discussed lower back extension stretching.  PT referral.  Alternate Ibuprofen and Tylenol for pain.  Heat or ice for pain per preference.  Flexeril 5 -10 mg at night time due to sedation. Up to twice daily if needed.  Did discuss steroid burst which she is currently deferring on.   Reassurance provided.  Follow-up prn       Pt seen in conjunction with Vijay Spears NP Student     CATHY Gavin, CNP  [facility]       "

## 2019-06-07 NOTE — LETTER
[place] [address] [address] [facility] [address]  Phone: [phone]    June 7, 2019            To whom it may concern:    RE: Dara Zavala      Patient was seen and treated today at our clinic and missed work. She will need occasional Physical Therapy appointments as directed by PT.      Please contact me for questions or concerns.      Sincerely,        CATHY Bean CNP

## 2019-06-07 NOTE — PATIENT INSTRUCTIONS
[facility]  Yunier Dalyalejandro   Address: [address] [place] [address] [address], [place] [address]  Phone: [phone]    Take Tylenol, Ibuprofen or Aleve for pain.   Ibuprofen and Aleve are both antiinflammatories so you should never take these together during the same 24 hour period.  If you take a high dose of Ibuprofen, do not take it consistently for more than 5 days   Tylenol only helps with pain and does not help with inflammation. Tylenol is the safest option if you have kidney or heart history.  You have to take at least 600mg of ibuprofen to get the antiinflammatory affect. 200-400mg of Ibuprofen will only help with pain.  It is ok to take Tylenol with Ibuprofen or Aleve  Do not take more than:  Tylenol (acetaminophen)  1000 mg 3 times a day  Ibuprofen (Advil/Motrin) 600 4 times a day or 800 mg 3 times a day WITH FOOD  Aleve 220mg 2 pills twice a day WITH FOOD

## 2019-06-26 ENCOUNTER — TELEPHONE (OUTPATIENT)
Dept: FAMILY MEDICINE | Facility: CLINIC | Age: 39
End: 2019-06-26

## 2019-06-26 NOTE — TELEPHONE ENCOUNTER
Reason for Call:  Orders     Detailed comments:     Please fax orders for   Order Questions     Question Answer Comment   Your provider has referred you to Physical Therapy at [facility] or [facility]    Reason for Referral Low Back Pain    Course of Action Evaluation and Treatment    Special Programs General      To fax number: [phone]    Call taken on 6/26/2019 at 10:37 AM by Gely Simental

## 2019-07-02 ENCOUNTER — TRANSFERRED RECORDS (OUTPATIENT)
Dept: HEALTH INFORMATION MANAGEMENT | Facility: CLINIC | Age: 39
End: 2019-07-02

## 2019-10-09 ENCOUNTER — TELEPHONE (OUTPATIENT)
Dept: INTERNAL MEDICINE | Facility: CLINIC | Age: 39
End: 2019-10-09

## 2019-10-09 ENCOUNTER — OFFICE VISIT (OUTPATIENT)
Dept: FAMILY MEDICINE | Facility: CLINIC | Age: 39
End: 2019-10-09
Payer: COMMERCIAL

## 2019-10-09 VITALS
OXYGEN SATURATION: 97 % | HEIGHT: 63 IN | RESPIRATION RATE: 16 BRPM | SYSTOLIC BLOOD PRESSURE: 124 MMHG | DIASTOLIC BLOOD PRESSURE: 80 MMHG | WEIGHT: 153.9 LBS | BODY MASS INDEX: 27.27 KG/M2 | HEART RATE: 82 BPM | TEMPERATURE: 98.6 F

## 2019-10-09 DIAGNOSIS — F17.200 SMOKER: ICD-10-CM

## 2019-10-09 DIAGNOSIS — M25.561 ACUTE PAIN OF RIGHT KNEE: Primary | ICD-10-CM

## 2019-10-09 PROCEDURE — 99214 OFFICE O/P EST MOD 30 MIN: CPT | Performed by: FAMILY MEDICINE

## 2019-10-09 RX ORDER — IBUPROFEN 600 MG/1
600 TABLET, FILM COATED ORAL EVERY 8 HOURS PRN
Qty: 60 TABLET | Refills: 0 | Status: SHIPPED | OUTPATIENT
Start: 2019-10-09 | End: 2019-10-14

## 2019-10-09 ASSESSMENT — MIFFLIN-ST. JEOR: SCORE: 1347.22

## 2019-10-09 NOTE — LETTER
FOR: Work         RE: Dara Zavala  : 1980    10/09/19              Dara  is seen today 10/09/19 at our clinic.   Please excuse from work   10/09/19 & 10/10/10.             Sincerely,      Marixa Goode MD

## 2019-10-09 NOTE — TELEPHONE ENCOUNTER
Spoke with the patient who reports pain behind her R knee and thigh when weight bearing, beginning last evening.  Denies injury, edema; reports recent sciatica, but states this seems different.  Concerned because the pain is worse today.  Self care, med use and f/u reviewed with the pt.      Transferred call to  staff for scheduling. Yasemin De La O RN October 9, 2019 10:19 AM

## 2019-10-09 NOTE — PROGRESS NOTES
"Subjective     Dara Zavala is a 39 year old female who presents to clinic today for the following health issues:    HPI   Musculoskeletal problem/pain      Duration:  Right knee pain- mild 4/10, constant since yesterday     Description  Location: Right knee- back of the knee  Noted after a new pair of shoes as well.  Was running down the hill with cousin- and that might have strained it too.  Pain is moderate- has not taken any ibuprofen or medications for pain.  Walking is making the pain worse  Better on resting.    Intensity:  moderate    Accompanying signs and symptoms: radiation of pain to back of Right calf and thigh    History  Previous similar problem: YES  Previous evaluation:  none    Precipitating or alleviating factors:  Trauma or overuse: YES  Aggravating factors include: all movements    Therapies tried and outcome: rest/inactivity    PROBLEMS TO ADD ON...hx of sciatica last 2.5 weeks and resolved on own    BP Readings from Last 3 Encounters:   10/09/19 124/80   06/07/19 132/65   10/19/18 132/83    Wt Readings from Last 3 Encounters:   10/09/19 69.8 kg (153 lb 14.4 oz)   06/07/19 67.6 kg (149 lb)   10/19/18 66.7 kg (147 lb)                    PROBLEMS TO ADD ON...  Reviewed and updated as needed this visit by Provider  Meds  Problems         Review of Systems   ROS COMP: Constitutional, HEENT, cardiovascular, pulmonary, GI, , musculoskeletal, neuro, skin, endocrine and psych systems are negative, except as otherwise noted.      Objective    /80   Pulse 82   Temp 98.6  F (37  C) (Oral)   Resp 16   Ht 1.6 m (5' 3\")   Wt 69.8 kg (153 lb 14.4 oz)   SpO2 97%   BMI 27.26 kg/m    Body mass index is 27.26 kg/m .  Physical Exam   GENERAL: healthy, alert and no distress  NECK: no adenopathy, no asymmetry, masses, or scars and thyroid normal to palpation  RESP: lungs clear to auscultation - no rales, rhonchi or wheezes  CV: regular rate and rhythm, normal S1 S2, no S3 or S4, no murmur, click " or rub, no peripheral edema and peripheral pulses strong  ABDOMEN: soft, nontender, no hepatosplenomegaly, no masses and bowel sounds normal  MS: no gross musculoskeletal defects noted, no edema  No gross abnormality or LOM on bilateral knee- no effusin. Good range of full motion.   Diagnostic Test Results:  Labs reviewed in Epic        Assessment & Plan     1. Acute pain of right knee  Knee strain  Avoid new physical activity- till knee pain is better  Warm or cold pack for comfort- 5-10 mins, three times daily   Ibuprofen 600 mg with meals up to three times daily for next 3-5 days  Follow up in clinic for persisting or unresolved pain      - ibuprofen (IBU) 600 MG tablet; Take 1 tablet (600 mg) by mouth every 8 hours as needed for moderate pain  Dispense: 60 tablet; Refill: 0    2. Smoker     Tobacco Cessation:   reports that she has been smoking cigarettes. She has never used smokeless tobacco.  Tobacco Cessation Action Plan: Information offered: Patient not interested at this time      Return in about 2 weeks (around 10/23/2019) for concerns,unresolved.    Marixa Goode MD  [facility]

## 2019-10-09 NOTE — PATIENT INSTRUCTIONS
Knee strain  Avoid new physical activity- till knee pain is better  Warm or cold pack for comfort- 5-10 mins, three times daily   Ibuprofen 600 mg with meals up to three times daily for next 3-5 days  Follow up in clinic for persisting or unresolved pain

## 2020-03-02 ENCOUNTER — HEALTH MAINTENANCE LETTER (OUTPATIENT)
Age: 40
End: 2020-03-02

## 2020-11-04 ENCOUNTER — OFFICE VISIT (OUTPATIENT)
Dept: FAMILY MEDICINE | Facility: CLINIC | Age: 40
End: 2020-11-04
Payer: COMMERCIAL

## 2020-11-04 VITALS
OXYGEN SATURATION: 99 % | DIASTOLIC BLOOD PRESSURE: 81 MMHG | TEMPERATURE: 97.2 F | HEART RATE: 84 BPM | WEIGHT: 153.9 LBS | SYSTOLIC BLOOD PRESSURE: 135 MMHG | BODY MASS INDEX: 27.27 KG/M2 | HEIGHT: 63 IN

## 2020-11-04 DIAGNOSIS — Z11.3 SCREEN FOR STD (SEXUALLY TRANSMITTED DISEASE): Primary | ICD-10-CM

## 2020-11-04 DIAGNOSIS — F17.200 SMOKER: ICD-10-CM

## 2020-11-04 DIAGNOSIS — R03.0 ELEVATED BP WITHOUT DIAGNOSIS OF HYPERTENSION: ICD-10-CM

## 2020-11-04 DIAGNOSIS — Z23 NEED FOR VACCINATION: ICD-10-CM

## 2020-11-04 PROCEDURE — 87591 N.GONORRHOEAE DNA AMP PROB: CPT | Performed by: FAMILY MEDICINE

## 2020-11-04 PROCEDURE — 36415 COLL VENOUS BLD VENIPUNCTURE: CPT | Performed by: FAMILY MEDICINE

## 2020-11-04 PROCEDURE — 87491 CHLMYD TRACH DNA AMP PROBE: CPT | Performed by: FAMILY MEDICINE

## 2020-11-04 PROCEDURE — 87389 HIV-1 AG W/HIV-1&-2 AB AG IA: CPT | Performed by: FAMILY MEDICINE

## 2020-11-04 PROCEDURE — 86780 TREPONEMA PALLIDUM: CPT | Mod: 90 | Performed by: FAMILY MEDICINE

## 2020-11-04 PROCEDURE — 99214 OFFICE O/P EST MOD 30 MIN: CPT | Performed by: FAMILY MEDICINE

## 2020-11-04 PROCEDURE — 99000 SPECIMEN HANDLING OFFICE-LAB: CPT | Performed by: FAMILY MEDICINE

## 2020-11-04 PROCEDURE — 86803 HEPATITIS C AB TEST: CPT | Performed by: FAMILY MEDICINE

## 2020-11-04 ASSESSMENT — MIFFLIN-ST. JEOR: SCORE: 1342.22

## 2020-11-04 NOTE — PATIENT INSTRUCTIONS
-Normal  BP is  119/79 or lower. Upper number is systolic Blood pressure (SBP). Lower number is diastolic  Blood pressure (DBP)  -Blood pressure between 120-139/80-89 (prehypertensive blood pressure/ class 1 hypertension )   -Hypertensive is  BP of 140/90 or above and needs treatment with medication.  -life style changes that are beneficial to reach goal of normal Blood pressure   1.Weight loss of 1 kg can reduce systolic Blood pressure  (SBP) by 1 mmHg  2.Health healthy diet (eg DASH dietary pattern can reduce SBP by 11 mmHg)  3.Structured excercise program (150 mins aerobic activity/week can reduce SBP by 5 mmHg)  4.Low salt  diet - very important.(eg: cut by 255 or 1000 mg/day to reduce SBP by 5mmHg)  5. Increase 4-5 serving of fresh vegetables & fruits /day- good source of potassium.    Other beneficial tips. Complete smoke cessation- whenever possible and ready.Reduction of alcohol     if More than 140/90  after a month of above life style changes  Return visit with MD/provider  for recheck & consideration of medications .

## 2020-11-04 NOTE — PROGRESS NOTES
"Subjective     Dara Zavala is a 40 year old female who presents to clinic today for the following health issues:    HPI       Chief Complaint   Patient presents with     STD     testing     Pt recently broke up with boyfriend so would like to get tested, no acute sx.   does smoke and no willingness to quit smoking        Review of Systems   Constitutional, HEENT, cardiovascular, pulmonary, GI, , musculoskeletal, neuro, skin, endocrine and psych systems are negative, except as otherwise noted.      Objective    /81 (Patient Position: Sitting, Cuff Size: Adult Regular)   Pulse 84   Temp 97.2  F (36.2  C) (Tympanic)   Ht 1.6 m (5' 3\")   Wt 69.8 kg (153 lb 14.4 oz)   LMP 10/04/2020 (Approximate)   SpO2 99%   BMI 27.26 kg/m    Body mass index is 27.26 kg/m .  Physical Exam   GENERAL: healthy, alert and no distress  NECK: no adenopathy, no asymmetry, masses, or scars and thyroid normal to palpation  RESP: lungs clear to auscultation - no rales, rhonchi or wheezes  CV: regular rates and rhythm  ABDOMEN: soft, nontender, no hepatosplenomegaly, no masses and bowel sounds normal   (female): normal female external genitalia, normal urethral meatus, vaginal mucosa, chlamydia & gonorrhea swab is sent    No results found for this or any previous visit (from the past 24 hour(s)).        Assessment & Plan     Dara was seen today for std.    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      Need for vaccination  Need for flu vaccinations & pneumonia vaccine discussed and offered, patient declined        Tobacco Cessation:   reports that she has been smoking cigarettes. She has never used smokeless tobacco.  Tobacco Cessation Action Plan: Information offered: Patient not interested at this time      BMI:   Estimated body mass index is 27.26 kg/m  as calculated from the following:    Height as of this encounter: 1.6 m (5' 3\").    Weight as of this encounter: 69.8 kg (153 lb 14.4 oz).              Return in about 2 weeks (around 11/18/2020) for concerns,unresolved.    Marixa Goode MD  [facility]    "

## 2020-11-05 LAB
C TRACH DNA SPEC QL NAA+PROBE: NEGATIVE
HCV AB SERPL QL IA: NONREACTIVE
HIV 1+2 AB+HIV1 P24 AG SERPL QL IA: NONREACTIVE
N GONORRHOEA DNA SPEC QL NAA+PROBE: NEGATIVE
SPECIMEN SOURCE: NORMAL
SPECIMEN SOURCE: NORMAL
T PALLIDUM AB SER QL: NONREACTIVE

## 2020-11-06 NOTE — RESULT ENCOUNTER NOTE
Negative HIV, human immunodeficiency virus  and syphilis chlamydia & gonorrhea and -HEPATITIS C VIRUS

## 2020-12-04 ENCOUNTER — OFFICE VISIT (OUTPATIENT)
Dept: URGENT CARE | Facility: URGENT CARE | Age: 40
End: 2020-12-04
Payer: COMMERCIAL

## 2020-12-04 VITALS
WEIGHT: 154 LBS | OXYGEN SATURATION: 99 % | BODY MASS INDEX: 27.28 KG/M2 | SYSTOLIC BLOOD PRESSURE: 145 MMHG | TEMPERATURE: 97.9 F | DIASTOLIC BLOOD PRESSURE: 82 MMHG | HEART RATE: 82 BPM

## 2020-12-04 DIAGNOSIS — L02.412 ABSCESS OF AXILLA, LEFT: Primary | ICD-10-CM

## 2020-12-04 PROCEDURE — 99213 OFFICE O/P EST LOW 20 MIN: CPT | Mod: 25 | Performed by: FAMILY MEDICINE

## 2020-12-04 PROCEDURE — 10060 I&D ABSCESS SIMPLE/SINGLE: CPT | Performed by: FAMILY MEDICINE

## 2020-12-04 RX ORDER — CEPHALEXIN 500 MG/1
500 CAPSULE ORAL 3 TIMES DAILY
Qty: 30 CAPSULE | Refills: 0 | Status: SHIPPED | OUTPATIENT
Start: 2020-12-04 | End: 2020-12-14

## 2020-12-05 NOTE — PROGRESS NOTES
Subjective         HPI   LEFT axillary abscess from ingrown hair mid axilla worsening in past days.  No fever or chills.  Slight purulent drainage from abscess today.  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  Stopped shaving to prevent recurrence.      Patient Active Problem List   Diagnosis     PCB (post coital bleeding)     Screening for cervical cancer     Bacterial vaginitis     Smoker     Screen for STD (sexually transmitted disease)     Need for vaccination     Elevated BP without diagnosis of hypertension     History reviewed. No pertinent surgical history.    Social History     Tobacco Use     Smoking status: Current Every Day Smoker     Types: Cigarettes     Smokeless tobacco: Never Used   Substance Use Topics     Alcohol use: Yes     Comment: 4-6 beers 2-3 x weekly     Family History   Problem Relation Age of Onset     Alcohol/Drug Mother         alcohol     Other - See Comments Father         accident-electricuted     Other - See Comments Maternal Grandmother         MVA     Alcohol/Drug Maternal Grandfather         drinker         Current Outpatient Medications   Medication Sig Dispense Refill     cephALEXin (KEFLEX) 500 MG capsule Take 1 capsule (500 mg) by mouth 3 times daily for 10 days 30 capsule 0     ibuprofen (ADVIL/MOTRIN) 200 MG tablet Take 600 mg by mouth every 6 hours as needed for mild pain       Allergies   Allergen Reactions     Penicillins      rash     Recent Labs   Lab Test 08/02/17  0844 06/28/16  0744 05/13/15  0758   LDL 62  --  77   HDL 84  --  73   TRIG 85  --  160*   CR 0.89  --   --    GFRESTIMATED 72  --   --    GFRESTBLACK 87  --   --    POTASSIUM 4.1  --   --    TSH  --  3.33  --       BP Readings from Last 3 Encounters:   12/04/20 (!) 145/82   11/04/20 135/81   10/09/19 124/80    Wt Readings from Last 3 Encounters:   12/04/20 69.9 kg (154 lb)   11/04/20 69.8 kg (153 lb 14.4 oz)   10/09/19 69.8 kg (153 lb 14.4 oz)                     Reviewed and updated as needed this visit by Provider         Review of Systems   ROS COMP: Constitutional, HEENT, cardiovascular, pulmonary, GI, , musculoskeletal, neuro, skin, endocrine and psych systems are negative, except as otherwise noted.      Objective    BP (!) 145/82   Pulse 82   Temp 97.9  F (36.6  C) (Tympanic)   Wt 69.9 kg (154 lb)   SpO2 99%   BMI 27.28 kg/m      Physical Exam   GENERAL: healthy, alert and no distress  EYES: Eyes grossly normal to inspection, PERRL and conjunctivae and sclerae normal  MS: no gross musculoskeletal defects noted, no edema  SKIN: LEFT central axillary abscess measuring 2 cm x 3 cm- 1 cm incision make with #15 blade with purulent drainage expressed  NEURO: Normal strength and tone, mentation intact and speech normal  PSYCH: mentation appears normal, affect normal/bright        Assessment & Plan     1. Abscess of left axilla    - cephALEXin (KEFLEX) 500 MG capsule; Take 1 capsule (500 mg) by mouth 3 times daily for 10 days  Dispense: 30 capsule; Refill: 0  - Incision and drainage     Tolerated I&D well with good results after-- much less tender and fluctuant.  Continue with hot packs to area to promote healing.  Keflex added.  Continue to keep clean and dry prn.    Jolynn Callaway MD  [facility]

## 2020-12-20 ENCOUNTER — HEALTH MAINTENANCE LETTER (OUTPATIENT)
Age: 40
End: 2020-12-20

## 2021-04-24 ENCOUNTER — HEALTH MAINTENANCE LETTER (OUTPATIENT)
Age: 41
End: 2021-04-24

## 2021-08-24 ENCOUNTER — ANCILLARY PROCEDURE (OUTPATIENT)
Dept: GENERAL RADIOLOGY | Facility: CLINIC | Age: 41
End: 2021-08-24
Attending: PHYSICIAN ASSISTANT
Payer: COMMERCIAL

## 2021-08-24 ENCOUNTER — OFFICE VISIT (OUTPATIENT)
Dept: URGENT CARE | Facility: URGENT CARE | Age: 41
End: 2021-08-24
Payer: COMMERCIAL

## 2021-08-24 VITALS
RESPIRATION RATE: 18 BRPM | DIASTOLIC BLOOD PRESSURE: 87 MMHG | TEMPERATURE: 98.5 F | SYSTOLIC BLOOD PRESSURE: 137 MMHG | HEART RATE: 80 BPM | OXYGEN SATURATION: 97 % | WEIGHT: 145.6 LBS | BODY MASS INDEX: 25.79 KG/M2

## 2021-08-24 DIAGNOSIS — M25.561 POSTERIOR KNEE PAIN, RIGHT: Primary | ICD-10-CM

## 2021-08-24 DIAGNOSIS — M25.561 POSTERIOR KNEE PAIN, RIGHT: ICD-10-CM

## 2021-08-24 PROCEDURE — 99214 OFFICE O/P EST MOD 30 MIN: CPT | Performed by: PHYSICIAN ASSISTANT

## 2021-08-24 PROCEDURE — 73562 X-RAY EXAM OF KNEE 3: CPT | Mod: RT | Performed by: RADIOLOGY

## 2021-08-24 ASSESSMENT — PAIN SCALES - GENERAL: PAINLEVEL: SEVERE PAIN (6)

## 2021-08-24 NOTE — LETTER
[facility] [address] [address]  Phone: [phone]    August 24, 2021        Dara Zavala  [address]  [facility] [address]          To whom it may concern:    RE: Dara Zavala    Patient was seen and treated today at our clinic and missed work on 8/23 and 8/24/2021.     Please contact me for questions or concerns.      Sincerely,        Mara Paniagua PA-C

## 2021-08-24 NOTE — PROGRESS NOTES
Chief Complaint   Patient presents with     Knee Pain     Patient has been having increased right knee pain for the past two days- over all pain has been ongoing for the past 3 months. inside of the knee states that the pain seems deep in the knee.         Medical Decision Making:    Differential Diagnosis:  MS Injury Pain: sprain, fracture, tendonitis, muscle strain, contusion, dislocation, bursitis and osteoarthritis, Bakers cyst, DVT        ASSESSMENT:    ICD-10-CM    1. Posterior knee pain, right  M25.561 XR Knee Right 3 Views     Orthopedic  Referral     US Lower Extremity Venous Duplex Right           PLAN: Right medial posterior knee pain x3 months, worse the past 2 days..  Suspect tendinitis but need to rule out more serious things.  Knee x-rays, order for lower extremity ultrasound to rule out DVT/Baker's cyst.  Call to schedule ultrasound within the next day or two.  Ortho referral 1 week.  Ace wrap.  Ibuprofen, elevate, ice 2 times daily.      Mara Paniagua PA-C        SUBJECTIVE:  Dara Zavala is an 40 year old female who presents with right medial posterior knee pain for 3 months but worse the last 2 days.  No injury.  No chest pain or shortness of breath.  No other joint pain.  No tick bites.  No headache or fever.  No back pain.  No treatment thus far.  Is a dental hygienist.  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  No calf swelling.    No past medical history on file.  History   Smoking Status     Current Every Day Smoker     Types: Cigarettes   Smokeless Tobacco     Never Used       ROS:  GEN no fevers  SKIN no erythema  Musculoskeletal:  See HPI.      OBJECTIVE:  Blood pressure 137/87, pulse 80, temperature 98.5  F (36.9  C), temperature source Tympanic, resp. rate 18, weight 66 kg (145 lb 9.6 oz), SpO2 97 %, not currently breastfeeding.  Patient is alert and NAD.  EYES: conjunctiva clear  Knee Exam (right):  Inspection:no swelling  seen  Palpation:Tender right medial posterior hamstring tendon insertion site. ? Fullness, ? Bakers's cyst.  Good doralis pedis.  Neurovascularly Intact Distally.   Full range of motion of knee.  No joint line tenderness.  No gross joint laxity noted.  Sensation lower extremity intact.      Mara Paniagua PA-C

## 2021-10-03 ENCOUNTER — HEALTH MAINTENANCE LETTER (OUTPATIENT)
Age: 41
End: 2021-10-03

## 2022-05-15 ENCOUNTER — HEALTH MAINTENANCE LETTER (OUTPATIENT)
Age: 42
End: 2022-05-15

## 2022-09-10 ENCOUNTER — HEALTH MAINTENANCE LETTER (OUTPATIENT)
Age: 42
End: 2022-09-10

## 2023-06-03 ENCOUNTER — HEALTH MAINTENANCE LETTER (OUTPATIENT)
Age: 43
End: 2023-06-03

## 2024-02-18 ENCOUNTER — HEALTH MAINTENANCE LETTER (OUTPATIENT)
Age: 44
End: 2024-02-18

## 2024-07-07 ENCOUNTER — HEALTH MAINTENANCE LETTER (OUTPATIENT)
Age: 44
End: 2024-07-07